# Patient Record
Sex: MALE | Race: ASIAN | NOT HISPANIC OR LATINO | Employment: FULL TIME | URBAN - METROPOLITAN AREA
[De-identification: names, ages, dates, MRNs, and addresses within clinical notes are randomized per-mention and may not be internally consistent; named-entity substitution may affect disease eponyms.]

---

## 2017-05-24 ENCOUNTER — HOSPITAL ENCOUNTER (INPATIENT)
Facility: HOSPITAL | Age: 32
LOS: 5 days | Discharge: HOME | DRG: 581 | End: 2017-05-29
Attending: EMERGENCY MEDICINE | Admitting: HOSPITALIST
Payer: COMMERCIAL

## 2017-05-24 DIAGNOSIS — L03.115 CELLULITIS OF RIGHT LOWER EXTREMITY: Primary | ICD-10-CM

## 2017-05-24 PROBLEM — L02.415 ABSCESS OF LEG, RIGHT: Status: ACTIVE | Noted: 2017-05-24

## 2017-05-24 PROBLEM — B20 HIV DISEASE: Status: ACTIVE | Noted: 2017-05-24

## 2017-05-24 LAB
ALBUMIN SERPL BCP-MCNC: 4.2 G/DL
ALP SERPL-CCNC: 109 U/L
ALT SERPL W/O P-5'-P-CCNC: 44 U/L
ANION GAP SERPL CALC-SCNC: 9 MMOL/L
ANISOCYTOSIS BLD QL SMEAR: SLIGHT
AST SERPL-CCNC: 30 U/L
BASOPHILS NFR BLD: 0 %
BILIRUB SERPL-MCNC: 0.6 MG/DL
BUN SERPL-MCNC: 17 MG/DL
CALCIUM SERPL-MCNC: 9.5 MG/DL
CHLORIDE SERPL-SCNC: 104 MMOL/L
CO2 SERPL-SCNC: 23 MMOL/L
CREAT SERPL-MCNC: 1.1 MG/DL
DIFFERENTIAL METHOD: ABNORMAL
EOSINOPHIL NFR BLD: 0 %
ERYTHROCYTE [DISTWIDTH] IN BLOOD BY AUTOMATED COUNT: 12.8 %
EST. GFR  (AFRICAN AMERICAN): >60 ML/MIN/1.73 M^2
EST. GFR  (NON AFRICAN AMERICAN): >60 ML/MIN/1.73 M^2
GLUCOSE SERPL-MCNC: 86 MG/DL
HCT VFR BLD AUTO: 48.3 %
HGB BLD-MCNC: 16.8 G/DL
LYMPHOCYTES NFR BLD: 36 %
MCH RBC QN AUTO: 30.1 PG
MCHC RBC AUTO-ENTMCNC: 34.8 %
MCV RBC AUTO: 86 FL
MONOCYTES NFR BLD: 10 %
NEUTROPHILS NFR BLD: 54 %
PLATELET # BLD AUTO: 294 K/UL
PMV BLD AUTO: 9.1 FL
POTASSIUM SERPL-SCNC: 4.3 MMOL/L
PROT SERPL-MCNC: 8.4 G/DL
RBC # BLD AUTO: 5.59 M/UL
SODIUM SERPL-SCNC: 136 MMOL/L
WBC # BLD AUTO: 4.86 K/UL

## 2017-05-24 PROCEDURE — 96366 THER/PROPH/DIAG IV INF ADDON: CPT

## 2017-05-24 PROCEDURE — 12000002 HC ACUTE/MED SURGE SEMI-PRIVATE ROOM

## 2017-05-24 PROCEDURE — 85027 COMPLETE CBC AUTOMATED: CPT

## 2017-05-24 PROCEDURE — 87070 CULTURE OTHR SPECIMN AEROBIC: CPT

## 2017-05-24 PROCEDURE — 99285 EMERGENCY DEPT VISIT HI MDM: CPT | Mod: 25

## 2017-05-24 PROCEDURE — 87186 SC STD MICRODIL/AGAR DIL: CPT

## 2017-05-24 PROCEDURE — 85007 BL SMEAR W/DIFF WBC COUNT: CPT

## 2017-05-24 PROCEDURE — 96375 TX/PRO/DX INJ NEW DRUG ADDON: CPT

## 2017-05-24 PROCEDURE — 87077 CULTURE AEROBIC IDENTIFY: CPT

## 2017-05-24 PROCEDURE — 87040 BLOOD CULTURE FOR BACTERIA: CPT

## 2017-05-24 PROCEDURE — 25000003 PHARM REV CODE 250: Performed by: EMERGENCY MEDICINE

## 2017-05-24 PROCEDURE — 63600175 PHARM REV CODE 636 W HCPCS: Performed by: EMERGENCY MEDICINE

## 2017-05-24 PROCEDURE — 10061 I&D ABSCESS COMP/MULTIPLE: CPT

## 2017-05-24 PROCEDURE — 96365 THER/PROPH/DIAG IV INF INIT: CPT

## 2017-05-24 PROCEDURE — 0J9L0ZZ DRAINAGE OF RIGHT UPPER LEG SUBCUTANEOUS TISSUE AND FASCIA, OPEN APPROACH: ICD-10-PCS | Performed by: EMERGENCY MEDICINE

## 2017-05-24 PROCEDURE — 96367 TX/PROPH/DG ADDL SEQ IV INF: CPT

## 2017-05-24 PROCEDURE — 80053 COMPREHEN METABOLIC PANEL: CPT

## 2017-05-24 RX ORDER — SULFAMETHOXAZOLE AND TRIMETHOPRIM 800; 160 MG/1; MG/1
1 TABLET ORAL
COMMUNITY
End: 2017-08-09 | Stop reason: ALTCHOICE

## 2017-05-24 RX ORDER — SODIUM CHLORIDE 0.9 % (FLUSH) 0.9 %
3 SYRINGE (ML) INJECTION EVERY 8 HOURS
Status: DISCONTINUED | OUTPATIENT
Start: 2017-05-24 | End: 2017-05-29 | Stop reason: HOSPADM

## 2017-05-24 RX ORDER — MORPHINE SULFATE 10 MG/ML
4 INJECTION INTRAMUSCULAR; INTRAVENOUS; SUBCUTANEOUS EVERY 4 HOURS PRN
Status: DISCONTINUED | OUTPATIENT
Start: 2017-05-24 | End: 2017-05-29 | Stop reason: HOSPADM

## 2017-05-24 RX ORDER — SODIUM CHLORIDE 9 MG/ML
INJECTION, SOLUTION INTRAVENOUS CONTINUOUS
Status: DISCONTINUED | OUTPATIENT
Start: 2017-05-24 | End: 2017-05-25

## 2017-05-24 RX ORDER — LEVOFLOXACIN 750 MG/1
750 TABLET ORAL DAILY
Status: ON HOLD | COMMUNITY
End: 2017-05-29 | Stop reason: HOSPADM

## 2017-05-24 RX ORDER — LIDOCAINE HYDROCHLORIDE 20 MG/ML
10 INJECTION, SOLUTION INFILTRATION; PERINEURAL
Status: COMPLETED | OUTPATIENT
Start: 2017-05-24 | End: 2017-05-24

## 2017-05-24 RX ORDER — MORPHINE SULFATE 10 MG/ML
5 INJECTION INTRAMUSCULAR; INTRAVENOUS; SUBCUTANEOUS ONCE
Status: COMPLETED | OUTPATIENT
Start: 2017-05-24 | End: 2017-05-24

## 2017-05-24 RX ORDER — ENOXAPARIN SODIUM 100 MG/ML
40 INJECTION SUBCUTANEOUS EVERY 24 HOURS
Status: DISCONTINUED | OUTPATIENT
Start: 2017-05-24 | End: 2017-05-24

## 2017-05-24 RX ORDER — ONDANSETRON 2 MG/ML
4 INJECTION INTRAMUSCULAR; INTRAVENOUS EVERY 12 HOURS PRN
Status: DISCONTINUED | OUTPATIENT
Start: 2017-05-24 | End: 2017-05-29 | Stop reason: HOSPADM

## 2017-05-24 RX ORDER — CLINDAMYCIN HYDROCHLORIDE 300 MG/1
300 CAPSULE ORAL EVERY 6 HOURS
Status: ON HOLD | COMMUNITY
End: 2017-05-29 | Stop reason: HOSPADM

## 2017-05-24 RX ADMIN — LIDOCAINE HYDROCHLORIDE 10 ML: 20 INJECTION, SOLUTION INFILTRATION; PERINEURAL at 11:05

## 2017-05-24 RX ADMIN — VANCOMYCIN HYDROCHLORIDE 1000 MG: 1 INJECTION, POWDER, LYOPHILIZED, FOR SOLUTION INTRAVENOUS at 12:05

## 2017-05-24 RX ADMIN — SODIUM CHLORIDE: 0.9 INJECTION, SOLUTION INTRAVENOUS at 09:05

## 2017-05-24 RX ADMIN — MORPHINE SULFATE 5 MG: 10 INJECTION INTRAVENOUS at 11:05

## 2017-05-24 RX ADMIN — Medication 3 ML: at 09:05

## 2017-05-24 RX ADMIN — PIPERACILLIN AND TAZOBACTAM 4.5 G: 4; .5 INJECTION, POWDER, LYOPHILIZED, FOR SOLUTION INTRAVENOUS; PARENTERAL at 09:05

## 2017-05-24 RX ADMIN — PIPERACILLIN AND TAZOBACTAM 4.5 G: 4; .5 INJECTION, POWDER, LYOPHILIZED, FOR SOLUTION INTRAVENOUS; PARENTERAL at 02:05

## 2017-05-24 RX ADMIN — SODIUM CHLORIDE: 0.9 INJECTION, SOLUTION INTRAVENOUS at 01:05

## 2017-05-24 NOTE — HPI
This 31 y.o. M, who has a past medical history of HIV disease and Syphilis, presents to the ED for evaluation of an abscess to the right medial thigh, just distal to the groin, x4 days. He notes he was seen at the [facility] 3 days ago and had it lanced. He was given two shots of antibiotics and started on Bactrim, Clindamycin and Levofloxacin. Despite compliance with antibiotics,swelling and redness got worse,he notes the site is more  red, painful and hard. He denies fever, nausea, vomiting, dizziness, numbness, weakness and headache. He is compliant with HIV medications as well.does not know his CD 4 count,Patient had another I&D in ER,only blood came out,culture has been done and patient has been started on broad spectrum IV Abx for failed out patient management for right leg abscess with cellulitis.

## 2017-05-24 NOTE — H&P
[facility] Hospital Medicine  History & Physical    Patient Name: Raz Webster  MRN: [id number]  Admission Date: 5/24/2017  Attending Physician: Eran Chacko MD   Primary Care Provider: Primary Doctor No         Patient information was obtained from patient and ER records.     Subjective:     Principal Problem:Abscess of leg, right    Chief Complaint:   Chief Complaint   Patient presents with    Rash     Pt. has abscess to the right thigh that began last week. Pt. states he was seen last monday at a health clinic and given three antibiotics and had abscess lanced. Pt. states he has had no impovment, wound is hard, and he developed a rash to face and abdomen today.     Abscess        HPI: This 31 y.o. M, who has a past medical history of HIV disease and Syphilis, presents to the ED for evaluation of an abscess to the right medial thigh, just distal to the groin, x4 days. He notes he was seen at the [facility] 3 days ago and had it lanced. He was given two shots of antibiotics and started on Bactrim, Clindamycin and Levofloxacin. Despite compliance with antibiotics,swelling and redness got worse,he notes the site is more  red, painful and hard. He denies fever, nausea, vomiting, dizziness, numbness, weakness and headache. He is compliant with HIV medications as well.does not know his CD 4 count,Patient had another I&D in ER,only blood came out,culture has been done and patient has been started on broad spectrum IV Abx for failed out patient management for right leg abscess with cellulitis.      Past Medical History:   Diagnosis Date    HIV disease     Syphilis        Past Surgical History:   Procedure Laterality Date    RECTAL SURGERY         Review of patient's allergies indicates:  No Known Allergies    No current facility-administered medications on file prior to encounter.      Current Outpatient Prescriptions on File Prior to Encounter   Medication Sig    desoximetasone  (TOPICORT) 0.25 % Spry Apply topically 2 (two) times daily.     Family History     None        Social History Main Topics    Smoking status: Never Smoker    Smokeless tobacco: Not on file    Alcohol use 0.0 oz/week      Comment: occasionally    Drug use: No    Sexual activity: Not on file     Review of Systems   Constitutional: Negative for activity change, appetite change and fever.   HENT: Negative for congestion and drooling.    Eyes: Negative for discharge and itching.   Respiratory: Negative for apnea and chest tightness.    Cardiovascular: Negative for chest pain and leg swelling.   Gastrointestinal: Negative for abdominal distention and abdominal pain.   Musculoskeletal: Negative for arthralgias and back pain.   Skin:        Right tight abscess,swelling,redness,tendeness   Allergic/Immunologic: Negative for environmental allergies and food allergies.   Neurological: Negative for dizziness and facial asymmetry.   Hematological: Negative for adenopathy. Does not bruise/bleed easily.   Psychiatric/Behavioral: Negative for agitation and behavioral problems.     Objective:     Vital Signs (Most Recent):  Temp: 98.5 °F (36.9 °C) (05/24/17 1254)  Pulse: 70 (05/24/17 1254)  Resp: 20 (05/24/17 1254)  BP: 102/62 (05/24/17 1254)  SpO2: 99 % (05/24/17 1254) Vital Signs (24h Range):  Temp:  [98 °F (36.7 °C)-98.5 °F (36.9 °C)] 98.5 °F (36.9 °C)  Pulse:  [70-93] 70  Resp:  [17-20] 20  SpO2:  [98 %-99 %] 99 %  BP: (102-132)/(62-81) 102/62     Weight: 68 kg (150 lb)  Body mass index is 22.81 kg/m².    Physical Exam   Constitutional: He is oriented to person, place, and time. No distress.   HENT:   Head: Atraumatic.   Eyes: Pupils are equal, round, and reactive to light.   Neck: Normal range of motion. Neck supple.   Cardiovascular: Normal rate and regular rhythm.    Pulmonary/Chest: Effort normal and breath sounds normal.   Abdominal: Soft. Bowel sounds are normal.   Musculoskeletal: Normal range of motion. He exhibits  edema.   Neurological: He is oriented to person, place, and time. No cranial nerve deficit. Coordination normal.   Skin: Skin is warm and dry. He is not diaphoretic.   Right tight abscess,swelling,redness,tendeness   Psychiatric: He has a normal mood and affect. His behavior is normal.        Significant Labs:   BMP:   Recent Labs  Lab 05/24/17  1100   GLU 86      K 4.3      CO2 23   BUN 17   CREATININE 1.1   CALCIUM 9.5     CBC:   Recent Labs  Lab 05/24/17  1100   WBC 4.86   HGB 16.8   HCT 48.3              Assessment/Plan:     * Abscess of leg, right    S/P I&D in ER,culture has been done,will continue with broad spectrum IV Abx,follow cultures.          Cellulitis of right lower extremity    On IV Abx as above.          HIV disease    Continue with home HIV medication.            VTE Risk Mitigation         Ordered     Medium Risk of VTE  Once      05/24/17 1220        Tano Caban MD  Department of Hospital Medicine   [facility]

## 2017-05-24 NOTE — ED PROVIDER NOTES
Encounter Date: 5/24/2017    SCRIBE #1 NOTE: I, Luis Mcgraw, am scribing for, and in the presence of,  Eran Chacko MD. I have scribed the following portions of the note - Other sections scribed: ROS, HPI.       History     Chief Complaint   Patient presents with    Rash     Pt. has abscess to the right thigh that began last week. Pt. states he was seen last monday at a health clinic and given three antibiotics and had abscess lanced. Pt. states he has had no impovment, wound is hard, and he developed a rash to face and abdomen today.     Abscess     Review of patient's allergies indicates:  No Known Allergies  CC: Abscess    HPI: This 31 y.o. M, who has a past medical history of HIV disease and Syphilis, presents to the ED for evaluation of an abscess to the right medial thigh, just distal to the groin, x4 days. He notes he was seen at the [facility] 3 days ago and had it lanced. He was given two shots of antibiotics and started on Bactrim, Clindamycin and Levofloxacin. Despite compliance with antibiotics, symptoms have redeveloped; he notes the site is red, painful and hard. He denies fever, nausea, vomiting, dizziness, numbness, weakness and headache. He is compliant with HIV medications as well. No hx of diabetes.           Past Medical History:   Diagnosis Date    HIV disease     Syphilis      Past Surgical History:   Procedure Laterality Date    RECTAL SURGERY       History reviewed. No pertinent family history.  Social History   Substance Use Topics    Smoking status: Never Smoker    Smokeless tobacco: Not on file    Alcohol use 0.0 oz/week      Comment: occasionally     Review of Systems   Constitutional: Negative for fever.   HENT: Negative for sore throat.    Eyes: Negative for visual disturbance.   Respiratory: Negative for shortness of breath.    Cardiovascular: Negative for chest pain.   Gastrointestinal: Negative for nausea and vomiting.   Genitourinary: Negative for  dysuria.   Musculoskeletal: Negative for back pain.   Skin: Positive for color change and wound. Negative for rash.        (+) Abscess to the right lateral thigh, just distal to the groin   Neurological: Negative for dizziness, weakness and numbness.   Hematological: Does not bruise/bleed easily.       Physical Exam     Initial Vitals [05/24/17 0946]   BP Pulse Resp Temp SpO2   132/81 93 17 98 °F (36.7 °C) 98 %     Physical Exam    Vitals reviewed.  Constitutional: He appears well-developed and well-nourished.   HENT:   Head: Normocephalic and atraumatic.   Eyes: EOM are normal. Pupils are equal, round, and reactive to light.   Neck: Normal range of motion. Neck supple.   Cardiovascular: Normal rate, regular rhythm, normal heart sounds and intact distal pulses.   Pulmonary/Chest: Breath sounds normal. No respiratory distress. He has no wheezes. He has no rhonchi. He has no rales.   Abdominal: Soft. Bowel sounds are normal.   Musculoskeletal: Normal range of motion.   Neurological: He is alert and oriented to person, place, and time.   Skin: Skin is warm and dry. Rash and abscess noted. Rash is papular (trunk). There is erythema.        See media tab for picture of infected area   Psychiatric: He has a normal mood and affect.         ED Course   I & D - Incision and Drainage  Date/Time: 5/24/2017 12:26 PM  Performed by: TAYLOR FLORES  Authorized by: TAYLOR FLORES   Consent Done: Yes  Consent: Verbal consent obtained.  Risks and benefits: risks, benefits and alternatives were discussed  Consent given by: patient  Type: abscess  Anesthesia: local infiltration    Anesthesia:  Anesthesia: local infiltration  Local Anesthetic: lidocaine 1% without epinephrine   Risk factor: underlying major vessel  Scalpel size: 11  Incision type: single straight  Complexity: simple  Drainage: bloody  Wound treatment: incision,  wound left open,  drainage,  expression of material,  clot removal,  drain placed and  wound  packed  Packing material: 1/4 in gauze        Labs Reviewed   CBC W/ AUTO DIFFERENTIAL - Abnormal; Notable for the following:        Result Value    MPV 9.1 (*)     All other components within normal limits   CULTURE, AEROBIC  (SPECIFY SOURCE)    Narrative:     Culture #1   CULTURE, BLOOD   CULTURE, BLOOD   COMPREHENSIVE METABOLIC PANEL             Medical Decision Making:   History:   Old Medical Records: I decided to obtain old medical records.  Initial Assessment:   Medical decision-making:    The patient received a medical screening exam. If performed, the EKG was independently evaluated by me and is pending final cardiology evaluation.  If performed, all radiographic studies were independently evaluated by me and are pending final radiology evaluation. If labs were ordered, they were reviewed. Vital signs are independently assessed by me.  If performed, the pulse oximetry was independently evaluated by me.  I decided to obtain the patient's past medical record.  If available, I reviewed the patient's past medical record, including most recent labs and radiology reports.    Differential Diagnosis:   Cellulitis, abscess, hematoma, vasculitis.   Clinical Tests:   Lab Tests: Ordered and Reviewed  ED Management:  Pt with HIV on medication with cellulitis to the RLE that has failed outpatient therapy with I&D and three types abx.   I performed I&D as above. Mostly bloody. Culture sent. Will start treatment with IV abx.  I discussed the patient's presentation and workup with the hospitalist who agrees with placing the patient in the hospital.  I have placed orders for the hospitalist.   The results and physical exam findings were reviewed with the patient. Pt agrees with assessment, disposition and treatment plan and has no further questions or complaints at this time.    ZANDER Chacko M.D. 12:28 PM 5/24/2017              Scribe Attestation:   Scribe #1: I performed the above scribed service and the documentation  accurately describes the services I performed. I attest to the accuracy of the note.    Attending Attestation:           Physician Attestation for Scribe:  Physician Attestation Statement for Scribe #1: I, Eran Chacko MD, reviewed documentation, as scribed by Luis Mcgraw in my presence, and it is both accurate and complete.                 ED Course     Clinical Impression:   The encounter diagnosis was Cellulitis of right lower extremity.    Disposition:   Disposition: Admitted  Condition: Stable       Eran Chacko MD  05/24/17 1223

## 2017-05-24 NOTE — NURSING
Patient arrived to unit via stretcher, transferred to bed independently,AAOx4,  mother at bedside. No acute distress noted.

## 2017-05-24 NOTE — SUBJECTIVE & OBJECTIVE
Past Medical History:   Diagnosis Date    HIV disease     Syphilis        Past Surgical History:   Procedure Laterality Date    RECTAL SURGERY         Review of patient's allergies indicates:  No Known Allergies    No current facility-administered medications on file prior to encounter.      Current Outpatient Prescriptions on File Prior to Encounter   Medication Sig    desoximetasone (TOPICORT) 0.25 % Spry Apply topically 2 (two) times daily.     Family History     None        Social History Main Topics    Smoking status: Never Smoker    Smokeless tobacco: Not on file    Alcohol use 0.0 oz/week      Comment: occasionally    Drug use: No    Sexual activity: Not on file     Review of Systems   Constitutional: Negative for activity change, appetite change and fever.   HENT: Negative for congestion and drooling.    Eyes: Negative for discharge and itching.   Respiratory: Negative for apnea and chest tightness.    Cardiovascular: Negative for chest pain and leg swelling.   Gastrointestinal: Negative for abdominal distention and abdominal pain.   Musculoskeletal: Negative for arthralgias and back pain.   Skin:        Right tight abscess,swelling,redness,tendeness   Allergic/Immunologic: Negative for environmental allergies and food allergies.   Neurological: Negative for dizziness and facial asymmetry.   Hematological: Negative for adenopathy. Does not bruise/bleed easily.   Psychiatric/Behavioral: Negative for agitation and behavioral problems.     Objective:     Vital Signs (Most Recent):  Temp: 98.5 °F (36.9 °C) (05/24/17 1254)  Pulse: 70 (05/24/17 1254)  Resp: 20 (05/24/17 1254)  BP: 102/62 (05/24/17 1254)  SpO2: 99 % (05/24/17 1254) Vital Signs (24h Range):  Temp:  [98 °F (36.7 °C)-98.5 °F (36.9 °C)] 98.5 °F (36.9 °C)  Pulse:  [70-93] 70  Resp:  [17-20] 20  SpO2:  [98 %-99 %] 99 %  BP: (102-132)/(62-81) 102/62     Weight: 68 kg (150 lb)  Body mass index is 22.81 kg/m².    Physical Exam   Constitutional: He  is oriented to person, place, and time. No distress.   HENT:   Head: Atraumatic.   Eyes: Pupils are equal, round, and reactive to light.   Neck: Normal range of motion. Neck supple.   Cardiovascular: Normal rate and regular rhythm.    Pulmonary/Chest: Effort normal and breath sounds normal.   Abdominal: Soft. Bowel sounds are normal.   Musculoskeletal: Normal range of motion. He exhibits edema.   Neurological: He is oriented to person, place, and time. No cranial nerve deficit. Coordination normal.   Skin: Skin is warm and dry. He is not diaphoretic.   Right tight abscess,swelling,redness,tendeness   Psychiatric: He has a normal mood and affect. His behavior is normal.        Significant Labs:   BMP:   Recent Labs  Lab 05/24/17  1100   GLU 86      K 4.3      CO2 23   BUN 17   CREATININE 1.1   CALCIUM 9.5     CBC:   Recent Labs  Lab 05/24/17  1100   WBC 4.86   HGB 16.8   HCT 48.3

## 2017-05-24 NOTE — ED TRIAGE NOTES
Pt. has abscess to the right thigh that began last week. Pt. states he was seen last monday at a health clinic and given three antibiotics and had abscess lanced. 15cm x 20 cm area with 3cm open area in center. Pt. states he has had no impovment, wound is hard, and he developed a rash to face and abdomen today. Hx of HIV.

## 2017-05-25 PROBLEM — R21 RASH OF BODY: Status: ACTIVE | Noted: 2017-05-25

## 2017-05-25 LAB
ANION GAP SERPL CALC-SCNC: 8 MMOL/L
ANION GAP SERPL CALC-SCNC: 8 MMOL/L
BASOPHILS # BLD AUTO: 0.04 K/UL
BASOPHILS NFR BLD: 0.7 %
BUN SERPL-MCNC: 14 MG/DL
BUN SERPL-MCNC: 14 MG/DL
CALCIUM SERPL-MCNC: 8.5 MG/DL
CALCIUM SERPL-MCNC: 8.5 MG/DL
CHLORIDE SERPL-SCNC: 106 MMOL/L
CHLORIDE SERPL-SCNC: 106 MMOL/L
CO2 SERPL-SCNC: 22 MMOL/L
CO2 SERPL-SCNC: 22 MMOL/L
CREAT SERPL-MCNC: 1.2 MG/DL
CREAT SERPL-MCNC: 1.2 MG/DL
DIFFERENTIAL METHOD: ABNORMAL
EOSINOPHIL # BLD AUTO: 0.2 K/UL
EOSINOPHIL NFR BLD: 4.1 %
ERYTHROCYTE [DISTWIDTH] IN BLOOD BY AUTOMATED COUNT: 13.2 %
EST. GFR  (AFRICAN AMERICAN): >60 ML/MIN/1.73 M^2
EST. GFR  (AFRICAN AMERICAN): >60 ML/MIN/1.73 M^2
EST. GFR  (NON AFRICAN AMERICAN): >60 ML/MIN/1.73 M^2
EST. GFR  (NON AFRICAN AMERICAN): >60 ML/MIN/1.73 M^2
GLUCOSE SERPL-MCNC: 102 MG/DL
GLUCOSE SERPL-MCNC: 102 MG/DL
HCT VFR BLD AUTO: 44.5 %
HGB BLD-MCNC: 15.3 G/DL
LYMPHOCYTES # BLD AUTO: 1.9 K/UL
LYMPHOCYTES NFR BLD: 33.9 %
MCH RBC QN AUTO: 30.1 PG
MCHC RBC AUTO-ENTMCNC: 34.4 %
MCV RBC AUTO: 88 FL
MONOCYTES # BLD AUTO: 0.7 K/UL
MONOCYTES NFR BLD: 12.1 %
NEUTROPHILS # BLD AUTO: 2.7 K/UL
NEUTROPHILS NFR BLD: 47.8 %
PLATELET # BLD AUTO: 280 K/UL
PMV BLD AUTO: 8.9 FL
POTASSIUM SERPL-SCNC: 4.3 MMOL/L
POTASSIUM SERPL-SCNC: 4.3 MMOL/L
RBC # BLD AUTO: 5.08 M/UL
SODIUM SERPL-SCNC: 136 MMOL/L
SODIUM SERPL-SCNC: 136 MMOL/L
WBC # BLD AUTO: 5.61 K/UL

## 2017-05-25 PROCEDURE — 80048 BASIC METABOLIC PNL TOTAL CA: CPT

## 2017-05-25 PROCEDURE — 63600175 PHARM REV CODE 636 W HCPCS: Performed by: INTERNAL MEDICINE

## 2017-05-25 PROCEDURE — 86592 SYPHILIS TEST NON-TREP QUAL: CPT

## 2017-05-25 PROCEDURE — 36415 COLL VENOUS BLD VENIPUNCTURE: CPT

## 2017-05-25 PROCEDURE — 25000003 PHARM REV CODE 250: Performed by: EMERGENCY MEDICINE

## 2017-05-25 PROCEDURE — 85025 COMPLETE CBC W/AUTO DIFF WBC: CPT

## 2017-05-25 PROCEDURE — 12000002 HC ACUTE/MED SURGE SEMI-PRIVATE ROOM

## 2017-05-25 PROCEDURE — 63600175 PHARM REV CODE 636 W HCPCS: Performed by: EMERGENCY MEDICINE

## 2017-05-25 PROCEDURE — 80202 ASSAY OF VANCOMYCIN: CPT

## 2017-05-25 RX ORDER — TRIAMCINOLONE ACETONIDE 1 MG/G
CREAM TOPICAL 2 TIMES DAILY
Status: DISCONTINUED | OUTPATIENT
Start: 2017-05-25 | End: 2017-05-25

## 2017-05-25 RX ADMIN — MORPHINE SULFATE 4 MG: 10 INJECTION INTRAVENOUS at 02:05

## 2017-05-25 RX ADMIN — VANCOMYCIN HYDROCHLORIDE 1000 MG: 1 INJECTION, POWDER, LYOPHILIZED, FOR SOLUTION INTRAVENOUS at 11:05

## 2017-05-25 RX ADMIN — PIPERACILLIN AND TAZOBACTAM 4.5 G: 4; .5 INJECTION, POWDER, LYOPHILIZED, FOR SOLUTION INTRAVENOUS; PARENTERAL at 05:05

## 2017-05-25 RX ADMIN — PENICILLIN G BENZATHINE 2.4 MILLION UNITS: 1200000 INJECTION, SUSPENSION INTRAMUSCULAR at 11:05

## 2017-05-25 RX ADMIN — VANCOMYCIN HYDROCHLORIDE 1000 MG: 1 INJECTION, POWDER, LYOPHILIZED, FOR SOLUTION INTRAVENOUS at 01:05

## 2017-05-25 RX ADMIN — Medication 3 ML: at 05:05

## 2017-05-25 NOTE — HOSPITAL COURSE
"Mr. Webster was admitted for failed outpatient management for right thigh abscess with cellulitis. Pt was started on broad spectrum antibiotics and was seen by Surgery. U/S showed no sign of fluid collection and Surgery felt no further intervention was necessary with only recommendation for daily wound care. Pt did have a rash develop that was thought to be related to Bactrim vs a dose of penicillin G given in case this was due to Syphilis. RPR was negative. CD4 count was not obtained given acute illness but patient did report it was going "the wrong way". Continued on HARRT. Blood cultures negative. Culture of purulent material grew MRSA. Treated with vancomycin and wound care while inpatient. Patient was taught by nursing on how to do dressing changes so can do it himself at home. Materials provided for home use. Abx changed to linezolid to complete a total of 14 days of antibiotic therapy, per ID recs. Is to follow up with HIV specialist and is to establish care with PCP. Regular diet. Activity as tolerated.   "

## 2017-05-25 NOTE — PROGRESS NOTES
Dr. Yu notified by patient of rash to body. Order noted for cream to apply to rash. Patient informed of NPO status until surgery sees him.

## 2017-05-25 NOTE — PLAN OF CARE
Problem: Patient Care Overview  Goal: Individualization & Mutuality  Outcome: Ongoing (interventions implemented as appropriate)  Pt progressing. Oriented x4. Voiding well. Skin integrity maintained. Pain controlled. VS stable. Adequate oral intake. Tolerating diet. IV fluids/antibiotics maintained. Free of falls. Call light in reach. Low bed. No issues during shift. Continue plan of care.      Problem: HIV/AIDS (Adult)  Goal: Signs and Symptoms of Listed Potential Problems Will be Absent, Minimized or Managed (HIV/AIDS)  Signs and symptoms of listed potential problems will be absent, minimized or managed by discharge/transition of care (reference HIV/AIDS (Adult) CPG).   Outcome: Ongoing (interventions implemented as appropriate)  Pt's home HIV medication given.     Problem: Skin and Soft Tissue Infection (Adult)  Goal: Signs and Symptoms of Listed Potential Problems Will be Absent, Minimized or Managed (Skin and Soft Tissue Infection)  Signs and symptoms of listed potential problems will be absent, minimized or managed by discharge/transition of care (reference Skin and Soft Tissue Infection (Adult) CPG).   Outcome: Ongoing (interventions implemented as appropriate)  Dried drainage noted to right thigh. I&D in ER. No wound care orders at this time. Dressing clean and intact.     Problem: Fall Risk (Adult)  Goal: Identify Related Risk Factors and Signs and Symptoms  Related risk factors and signs and symptoms are identified upon initiation of Human Response Clinical Practice Guideline (CPG)   Outcome: Ongoing (interventions implemented as appropriate)  Free of falls.     Problem: Pain, Acute (Adult)  Goal: Identify Related Risk Factors and Signs and Symptoms  Related risk factors and signs and symptoms are identified upon initiation of Human Response Clinical Practice Guideline (CPG)   Outcome: Ongoing (interventions implemented as appropriate)  No reports of pain at this time.

## 2017-05-25 NOTE — PLAN OF CARE
Problem: Patient Care Overview  Goal: Plan of Care Review  Outcome: Ongoing (interventions implemented as appropriate)  Continue with wound care to right thigh BID. Continue with IVAB. ID on case and general surgery.Had U/S of right lower ext. Today.

## 2017-05-25 NOTE — ASSESSMENT & PLAN NOTE
Started yesterday,could not be duo to IV Abx,also PO Abx was started few days back,started on kenalog oitment.

## 2017-05-25 NOTE — SUBJECTIVE & OBJECTIVE
Past Medical History:   Diagnosis Date    HIV disease     Syphilis        Past Surgical History:   Procedure Laterality Date    RECTAL SURGERY         Review of patient's allergies indicates:  No Known Allergies    No current facility-administered medications on file prior to encounter.      Current Outpatient Prescriptions on File Prior to Encounter   Medication Sig    desoximetasone (TOPICORT) 0.25 % Spry Apply topically 2 (two) times daily.     Family History     None        Social History Main Topics    Smoking status: Never Smoker    Smokeless tobacco: Not on file    Alcohol use 0.0 oz/week      Comment: occasionally    Drug use: No    Sexual activity: Not on file     Review of Systems   Constitutional: Negative for activity change, appetite change and fever.   HENT: Negative for congestion and drooling.    Eyes: Negative for discharge and itching.   Respiratory: Negative for apnea and chest tightness.    Cardiovascular: Negative for chest pain and leg swelling.   Gastrointestinal: Negative for abdominal distention and abdominal pain.   Musculoskeletal: Negative for arthralgias and back pain.   Skin:        Right tight abscess,swelling,redness,tendeness   Allergic/Immunologic: Negative for environmental allergies and food allergies.   Neurological: Negative for dizziness and facial asymmetry.   Hematological: Negative for adenopathy. Does not bruise/bleed easily.   Psychiatric/Behavioral: Negative for agitation and behavioral problems.     Objective:     Vital Signs (Most Recent):  Temp: 97.7 °F (36.5 °C) (05/25/17 0357)  Pulse: 70 (05/25/17 0357)  Resp: 17 (05/25/17 0357)  BP: 98/63 (05/25/17 0357)  SpO2: 98 % (05/25/17 0357) Vital Signs (24h Range):  Temp:  [97.7 °F (36.5 °C)-98.8 °F (37.1 °C)] 97.7 °F (36.5 °C)  Pulse:  [70-93] 70  Resp:  [17-20] 17  SpO2:  [97 %-99 %] 98 %  BP: ()/(55-81) 98/63     Weight: 68 kg (150 lb)  Body mass index is 22.81 kg/m².    Physical Exam   Constitutional: He  is oriented to person, place, and time. No distress.   HENT:   Head: Atraumatic.   Eyes: Pupils are equal, round, and reactive to light.   Neck: Normal range of motion. Neck supple.   Cardiovascular: Normal rate and regular rhythm.    Pulmonary/Chest: Effort normal and breath sounds normal.   Abdominal: Soft. Bowel sounds are normal.   Musculoskeletal: Normal range of motion. He exhibits edema.   Neurological: He is oriented to person, place, and time. No cranial nerve deficit. Coordination normal.   Skin: Skin is warm and dry. He is not diaphoretic.   Right tight abscess,swelling,redness,tendeness   Psychiatric: He has a normal mood and affect. His behavior is normal.        Significant Labs:   BMP:     Recent Labs  Lab 05/25/17  0319     102     136   K 4.3  4.3     106   CO2 22*  22*   BUN 14  14   CREATININE 1.2  1.2   CALCIUM 8.5*  8.5*     CBC:     Recent Labs  Lab 05/24/17  1100 05/25/17  0319   WBC 4.86 5.61   HGB 16.8 15.3   HCT 48.3 44.5    280

## 2017-05-25 NOTE — PLAN OF CARE
05/25/17 1157   Discharge Assessment   Assessment Type Discharge Planning Assessment   Confirmed/corrected address and phone number on facesheet? Yes   Assessment information obtained from? Patient   Prior to hospitilization cognitive status: Alert/Oriented   Prior to hospitalization functional status: Independent   Current cognitive status: Alert/Oriented   Current Functional Status: Independent   Arrived From home or self-care   Lives With alone   Able to Return to Prior Arrangements yes   Is patient able to care for self after discharge? Yes   How many people do you have in your home that can help with your care after discharge? 0   Who are your caregiver(s) and their phone number(s)? Pt mother Malka [phone]   Patient's perception of discharge disposition home or selfcare   Readmission Within The Last 30 Days no previous admission in last 30 days   Patient currently being followed by outpatient case management? No   Patient currently receives home health services? No   Does the patient currently use HME? No   Patient currently receives private duty nursing? No   Patient currently receives any other outside agency services? No   Do you have any problems affording any of your prescribed medications? No   Is the patient taking medications as prescribed? yes   Do you have any financial concerns preventing you from receiving the healthcare you need? No   Does the patient have transportation to healthcare appointments? No   Does the patient receive services at the [facility]? No   Discharge Plan A Home with family   Discharge Plan B Home   Patient/Family In Agreement With Plan yes     Pt demographics verified. Pt informed SW he lives home alone, he is independent, and he is able to manage his care at home. SW explained the role of CM and provided pt with SW contact information. SW name and number placed on pt white board.       [employer] [facility] [employer] [address] [place] [address], [place] - [address] AT [facility] [address]  [address]  [place] [address]  Phone: [phone] Fax: [phone]

## 2017-05-25 NOTE — CONSULTS
Consult Note  Infectious Disease    Consult Requested By: Tano Caban MD    Reason for Consult: rash and rt thigh abscess    SUBJECTIVE:     History of Present Illness:  Patient is a 31 y.o. male presents with nonhealing rt thigh abscess. He is hiv positive for 2 years now and is rx with descovy and prezcobix by . He does not know his cd4 count but states his numbers are heading in the wrong direction.he became ill with a rt thigh abscess about 8 days ago and had an i and  D in an urgent care clinic. He was rx with bactrim, levaquin and clindamycin but did not resolve. He developed a rash yesterday after 7 days of rx. He has had syphilis before and does admit to unprotected oral sex.an us of the rt thigh is planned, further i and  D carried out in er. Blood cultures are negative from 5/24/17.    Past Medical History:   Diagnosis Date    HIV disease     Syphilis      Past Surgical History:   Procedure Laterality Date    RECTAL SURGERY       History reviewed. No pertinent family history.  Social History   Substance Use Topics    Smoking status: Never Smoker    Smokeless tobacco: Not on file    Alcohol use 0.0 oz/week      Comment: occasionally       Review of patient's allergies indicates:  No Known Allergies     Antibiotics     Start     Stop Route Frequency Ordered    05/25/17 0000  vancomycin 1 g in dextrose 5 % 250 mL IVPB (ready to mix system)      -- IV Every 12 hours (non-standard times) 05/24/17 1220    05/24/17 1330  piperacillin-tazobactam 4.5 g in sodium chloride 0.9% 100 mL IVPB (ready to mix system)      -- IV Every 8 hours (non-standard times) 05/24/17 1220          Review of Systems:  Constitutional: no fever or chills  Eyes: no visual changes  ENT: no nasal congestion or sore throat  Respiratory: no cough or shortness of breath  Cardiovascular: no chest pain or palpitations  Gastrointestinal: no nausea or vomiting, no abdominal pain or change in bowel habits  Genitourinary:  no hematuria or dysuria  Integument/Breast: rt thigh redness. rash all over body- not itchy  Musculoskeletal: no arthralgias or myalgias  Neurological: no seizures or tremors    OBJECTIVE:     Vital Signs (Most Recent)  Temp: 98.4 °F (36.9 °C) (05/25/17 0755)  Pulse: 61 (05/25/17 0755)  Resp: 18 (05/25/17 0755)  BP: (!) 100/58 (05/25/17 0755)  SpO2: 98 % (05/25/17 0755)    Temperature Range Min/Max (Last 24H):  Temp:  [97.7 °F (36.5 °C)-98.8 °F (37.1 °C)]     Physical Exam:  General: well developed, well nourished  HENT: Head:normocephalic, atraumatic. Ears:not examined. Nose: Nares normal. Septum midline. Mucosa normal. No drainage or sinus tenderness., no discharge. Throat: lips, mucosa, and tongue normal; teeth and gums normal and no throat erythema.  Eyes: conjunctivae/corneas clear. PERRL.   Neck: supple, symmetrical, trachea midline, no JVD and thyroid not enlarged, symmetric, no tenderness/mass/nodules  Lungs:  clear to auscultation bilaterally and normal respiratory effort  Cardiovascular: Heart: regular rate and rhythm, S1, S2 normal, no murmur, click, rub or gallop. Chest Wall: no tenderness. Extremities: no cyanosis or edema, or clubbing. Pulses: 2+ and symmetric.  Abdomen/Rectal: Abdomen: soft, non-tender non-distented; bowel sounds normal; no masses,  no organomegaly. Rectal: not examined  Skin: diffuse rash all over body with involvement of palms and soles  Musculoskeletal:no clubbing, cyanosis  Lymph Nodes: No cervical or supraclavicular adenopathy    Laboratory:  CBC    Recent Labs  Lab 05/25/17 0319   WBC 5.61   RBC 5.08   HGB 15.3   HCT 44.5        BMP    Recent Labs  Lab 05/25/17 0319   CO2 22*  22*   BUN 14  14   CREATININE 1.2  1.2   CALCIUM 8.5*  8.5*     No results for input(s): COLORU, CLARITYU, SPECGRAV, PHUR, PROTEINUA, GLUCOSEU, BILIRUBINCON, BLOODU, WBCU, RBCU, BACTERIA, MUCUS, NITRITE, LEUKOCYTESUR, UROBILINOGEN, HYALINECASTS in the last 168 hours.  Microbiology Results  (last 7 days)     Procedure Component Value Units Date/Time    Blood culture [[id number]] Collected:  05/24/17 1128    Order Status:  Completed Specimen:  Blood from Peripheral, Hand, Right Updated:  05/24/17 2112     Blood Culture, Routine No Growth to date    Blood culture [[id number]] Collected:  05/24/17 1144    Order Status:  Completed Specimen:  Blood from Peripheral, Hand, Left Updated:  05/24/17 1912     Blood Culture, Routine No Growth to date    Aerobic culture #1 [[id number]] Collected:  05/24/17 1200    Order Status:  Sent Specimen:  Abscess from Abscess Updated:  05/24/17 1225    Narrative:       Culture #1          Diagnostic Results:  Labs: Reviewed  X-Ray: Reviewed    ASSESSMENT/PLAN:     Active Hospital Problems    Diagnosis  POA    *Abscess of leg, right [L02.415]  Yes    Rash of body [R21]  Yes    Cellulitis of right lower extremity [L03.115]  Yes    HIV disease [B20]  Yes      Resolved Hospital Problems    Diagnosis Date Resolved POA   No resolved problems to display.       1. Rt thigh abscess  Suspect staph aureus  2. Diffuse rash- concern for syphilis  3. Hiv, cd4 unknown. Continue haart and fu with primary id md

## 2017-05-25 NOTE — ASSESSMENT & PLAN NOTE
S/P I&D in ER,culture has been done,will continue with broad spectrum IV Abx,follow cultures.still is swollen,consult surgery,NPO at this time.

## 2017-05-25 NOTE — CONSULTS
[facility]  Surgery Consult    Subjective:     Reason for consultation: right thigh abscess    History of Present Illness:   Patient is a 31 y.o. male presents with abscess. Onset of symptoms was gradual starting a few days ago with unchanged course since that time. Patient report I&D at outside facility and started on PO ABX.  Redness failed to improve therefore presented to the ED.  Had repeat I&D w/ drainage bloody fluid.  He denies fevers or chills.    Patient Active Problem List   Diagnosis    Cellulitis of right lower extremity    Abscess of leg, right    HIV disease    Rash of body          No current facility-administered medications on file prior to encounter.      Current Outpatient Prescriptions on File Prior to Encounter   Medication Sig Dispense Refill    desoximetasone (TOPICORT) 0.25 % Spry Apply topically 2 (two) times daily.          Review of patient's allergies indicates:  No Known Allergies       Past Medical History:   Diagnosis Date    HIV disease     Syphilis           Past Surgical History:   Procedure Laterality Date    RECTAL SURGERY          History reviewed. No pertinent family history.       Social History     Social History    Marital status: Single     Spouse name: N/A    Number of children: N/A    Years of education: N/A     Occupational History    Not on file.     Social History Main Topics    Smoking status: Never Smoker    Smokeless tobacco: Not on file    Alcohol use 0.0 oz/week      Comment: occasionally    Drug use: No    Sexual activity: Not on file     Other Topics Concern    Not on file     Social History Narrative    No narrative on file         Review of Systems:  Constitutional: negative for anorexia, chills and fevers  Respiratory: negative for cough and dyspnea on exertion  Cardiovascular: negative for chest pain, fatigue and palpitations  Hematologic/lymphatic: negative for bleeding and easy bruising     Physical Exam:    Vitals:     05/25/17 0357   BP: 98/63   Pulse: 70   Resp: 17   Temp: 97.7 °F (36.5 °C)       General: Alert and oriented, No acute distress.    Neck: Supple, Non-tender, No jugular venous distention.  No mass or LAD  Respiratory: Respirations are non-labored, Symmetrical chest wall expansion, No chest wall tenderness.   Cardiovascular: Normal rate, Regular rhythm, Extremities warm, No edema.   Gastrointestinal: Soft, Non-tender, Non-distended, No organomegaly.    Musculoskeletal: grossly Normal range of motion, Normal strength, No tenderness.   Integumentary: R upper inner thigh w/ I&D site, surrounding induration and cellulitis and packing place.  Packing removed, no drainage on palpatoin.  No fluctuance      Data Review:  CBC:   Recent Labs  Lab 05/25/17 0319   WBC 5.61   RBC 5.08   HGB 15.3   HCT 44.5      MCV 88   MCH 30.1   MCHC 34.4     CMP:   Recent Labs  Lab 05/24/17  1100 05/25/17 0319   GLU 86 102  102   CALCIUM 9.5 8.5*  8.5*   ALBUMIN 4.2  --    PROT 8.4  --     136  136   K 4.3 4.3  4.3   CO2 23 22*  22*    106  106   BUN 17 14  14   CREATININE 1.1 1.2  1.2   ALKPHOS 109  --    ALT 44  --    AST 30  --    BILITOT 0.6  --        ASSESSMENT/PLAN:     R thigh soft tissue infection  - s/p I&D by ERMD  - does not appear to have undrained process   - will order u/s to further evaluate  - begin BID packing changes  - ABX as per primary team  - ok for diet at this time

## 2017-05-25 NOTE — PROGRESS NOTES
[facility] Hospital Medicine  Progress Note    Patient Name: Raz Webster  MRN: [id number]  Patient Class: IP- Inpatient   Admission Date: 5/24/2017  Length of Stay: 1 days  Attending Physician: Tano Caban MD  Primary Care Provider: Primary Doctor No        Subjective:     Principal Problem:Abscess of leg, right    HPI:  This 31 y.o. M, who has a past medical history of HIV disease and Syphilis, presents to the ED for evaluation of an abscess to the right medial thigh, just distal to the groin, x4 days. He notes he was seen at the [facility] 3 days ago and had it lanced. He was given two shots of antibiotics and started on Bactrim, Clindamycin and Levofloxacin. Despite compliance with antibiotics,swelling and redness got worse,he notes the site is more  red, painful and hard. He denies fever, nausea, vomiting, dizziness, numbness, weakness and headache. He is compliant with HIV medications as well.does not know his CD 4 count,Patient had another I&D in ER,only blood came out,culture has been done and patient has been started on broad spectrum IV Abx for failed out patient management for right leg abscess with cellulitis.      Hospital Course:  This 31 y.o. M, who has a past medical history of HIV disease and Syphilis, presents to the ED for evaluation of an abscess to the right medial thigh, just distal to the groin, x4 days. He notes he was seen at the [facility] few  days ago and had it lanced. He was given two shots of antibiotics and started on Bactrim, Clindamycin and Levofloxacin. Despite compliance with antibiotics,swelling and redness got worse,he notes the site is more  red, painful and hard. He denies fever, nausea, vomiting, dizziness, numbness, weakness and headache. He is compliant with HIV medications as well.does not know his CD 4 count,he has been followed by ID in ,Patient had another I&D in ER,only blood came out,culture has been done and patient  has been started on broad spectrum IV Abx for failed out patient management for right leg abscess with cellulitis.right thigh is still swollen,consulted surgery for evaluation.keep NPO at this time.  Has rash in body,started just yesetday,could not be duo to IV Abx,he started also PO A bx few days ago.started on kenalog.      Past Medical History:   Diagnosis Date    HIV disease     Syphilis        Past Surgical History:   Procedure Laterality Date    RECTAL SURGERY         Review of patient's allergies indicates:  No Known Allergies    No current facility-administered medications on file prior to encounter.      Current Outpatient Prescriptions on File Prior to Encounter   Medication Sig    desoximetasone (TOPICORT) 0.25 % Spry Apply topically 2 (two) times daily.     Family History     None        Social History Main Topics    Smoking status: Never Smoker    Smokeless tobacco: Not on file    Alcohol use 0.0 oz/week      Comment: occasionally    Drug use: No    Sexual activity: Not on file     Review of Systems   Constitutional: Negative for activity change, appetite change and fever.   HENT: Negative for congestion and drooling.    Eyes: Negative for discharge and itching.   Respiratory: Negative for apnea and chest tightness.    Cardiovascular: Negative for chest pain and leg swelling.   Gastrointestinal: Negative for abdominal distention and abdominal pain.   Musculoskeletal: Negative for arthralgias and back pain.   Skin:        Right tight abscess,swelling,redness,tendeness   Allergic/Immunologic: Negative for environmental allergies and food allergies.   Neurological: Negative for dizziness and facial asymmetry.   Hematological: Negative for adenopathy. Does not bruise/bleed easily.   Psychiatric/Behavioral: Negative for agitation and behavioral problems.     Objective:     Vital Signs (Most Recent):  Temp: 97.7 °F (36.5 °C) (05/25/17 0357)  Pulse: 70 (05/25/17 0357)  Resp: 17 (05/25/17 0357)  BP:  98/63 (05/25/17 0357)  SpO2: 98 % (05/25/17 0357) Vital Signs (24h Range):  Temp:  [97.7 °F (36.5 °C)-98.8 °F (37.1 °C)] 97.7 °F (36.5 °C)  Pulse:  [70-93] 70  Resp:  [17-20] 17  SpO2:  [97 %-99 %] 98 %  BP: ()/(55-81) 98/63     Weight: 68 kg (150 lb)  Body mass index is 22.81 kg/m².    Physical Exam   Constitutional: He is oriented to person, place, and time. No distress.   HENT:   Head: Atraumatic.   Eyes: Pupils are equal, round, and reactive to light.   Neck: Normal range of motion. Neck supple.   Cardiovascular: Normal rate and regular rhythm.    Pulmonary/Chest: Effort normal and breath sounds normal.   Abdominal: Soft. Bowel sounds are normal.   Musculoskeletal: Normal range of motion. He exhibits edema.   Neurological: He is oriented to person, place, and time. No cranial nerve deficit. Coordination normal.   Skin: Skin is warm and dry. He is not diaphoretic.   Right tight abscess,swelling,redness,tendeness   Psychiatric: He has a normal mood and affect. His behavior is normal.        Significant Labs:   BMP:     Recent Labs  Lab 05/25/17  0319     102     136   K 4.3  4.3     106   CO2 22*  22*   BUN 14  14   CREATININE 1.2  1.2   CALCIUM 8.5*  8.5*     CBC:     Recent Labs  Lab 05/24/17  1100 05/25/17  0319   WBC 4.86 5.61   HGB 16.8 15.3   HCT 48.3 44.5    280           Assessment/Plan:      * Abscess of leg, right    S/P I&D in ER,culture has been done,will continue with broad spectrum IV Abx,follow cultures.still is swollen,consult surgery,NPO at this time.          Cellulitis of right lower extremity    On IV Abx as above.          HIV disease    Continue with home HIV medication.has been followed by IF in [place].          Rash of body    Started yesterday,could not be duo to IV Abx,also PO Abx was started few days back,started on kenalog oitment.            VTE Risk Mitigation         Ordered     Medium Risk of VTE  Once      05/24/17 1220          Tano  MD Mee  Department of Hospital Medicine   [facility]

## 2017-05-25 NOTE — PROGRESS NOTES
Medicated prior to wound care. Right thigh wound care done. Cleansed with NS then packed with iodoform guaze and dressed with gauze and paper tape. Still hard but less tender and less red. Tolerated procedure well.

## 2017-05-26 LAB
ANION GAP SERPL CALC-SCNC: 6 MMOL/L
ANION GAP SERPL CALC-SCNC: 6 MMOL/L
BASOPHILS # BLD AUTO: 0.02 K/UL
BASOPHILS NFR BLD: 0.3 %
BUN SERPL-MCNC: 15 MG/DL
BUN SERPL-MCNC: 15 MG/DL
CALCIUM SERPL-MCNC: 8.8 MG/DL
CALCIUM SERPL-MCNC: 8.8 MG/DL
CHLORIDE SERPL-SCNC: 106 MMOL/L
CHLORIDE SERPL-SCNC: 106 MMOL/L
CO2 SERPL-SCNC: 25 MMOL/L
CO2 SERPL-SCNC: 25 MMOL/L
CREAT SERPL-MCNC: 1 MG/DL
CREAT SERPL-MCNC: 1 MG/DL
DIFFERENTIAL METHOD: ABNORMAL
EOSINOPHIL # BLD AUTO: 0.3 K/UL
EOSINOPHIL NFR BLD: 4.3 %
ERYTHROCYTE [DISTWIDTH] IN BLOOD BY AUTOMATED COUNT: 12.9 %
EST. GFR  (AFRICAN AMERICAN): >60 ML/MIN/1.73 M^2
EST. GFR  (AFRICAN AMERICAN): >60 ML/MIN/1.73 M^2
EST. GFR  (NON AFRICAN AMERICAN): >60 ML/MIN/1.73 M^2
EST. GFR  (NON AFRICAN AMERICAN): >60 ML/MIN/1.73 M^2
GLUCOSE SERPL-MCNC: 97 MG/DL
GLUCOSE SERPL-MCNC: 97 MG/DL
HCT VFR BLD AUTO: 44.5 %
HGB BLD-MCNC: 15.4 G/DL
LYMPHOCYTES # BLD AUTO: 1.9 K/UL
LYMPHOCYTES NFR BLD: 31 %
MCH RBC QN AUTO: 30.4 PG
MCHC RBC AUTO-ENTMCNC: 34.6 %
MCV RBC AUTO: 88 FL
MONOCYTES # BLD AUTO: 0.7 K/UL
MONOCYTES NFR BLD: 11.5 %
NEUTROPHILS # BLD AUTO: 3.2 K/UL
NEUTROPHILS NFR BLD: 52.1 %
PLATELET # BLD AUTO: 276 K/UL
PMV BLD AUTO: 8.9 FL
POTASSIUM SERPL-SCNC: 4.1 MMOL/L
POTASSIUM SERPL-SCNC: 4.1 MMOL/L
RBC # BLD AUTO: 5.07 M/UL
RPR SER QL: NORMAL
SODIUM SERPL-SCNC: 137 MMOL/L
SODIUM SERPL-SCNC: 137 MMOL/L
VANCOMYCIN TROUGH SERPL-MCNC: 8.5 UG/ML
WBC # BLD AUTO: 6.1 K/UL

## 2017-05-26 PROCEDURE — 36415 COLL VENOUS BLD VENIPUNCTURE: CPT

## 2017-05-26 PROCEDURE — 63600175 PHARM REV CODE 636 W HCPCS: Performed by: EMERGENCY MEDICINE

## 2017-05-26 PROCEDURE — 80048 BASIC METABOLIC PNL TOTAL CA: CPT

## 2017-05-26 PROCEDURE — 25000003 PHARM REV CODE 250: Performed by: HOSPITALIST

## 2017-05-26 PROCEDURE — 85025 COMPLETE CBC W/AUTO DIFF WBC: CPT

## 2017-05-26 PROCEDURE — 25000003 PHARM REV CODE 250: Performed by: EMERGENCY MEDICINE

## 2017-05-26 PROCEDURE — 12000002 HC ACUTE/MED SURGE SEMI-PRIVATE ROOM

## 2017-05-26 PROCEDURE — 63600175 PHARM REV CODE 636 W HCPCS: Performed by: HOSPITALIST

## 2017-05-26 RX ADMIN — MORPHINE SULFATE 4 MG: 10 INJECTION INTRAVENOUS at 06:05

## 2017-05-26 RX ADMIN — VANCOMYCIN HYDROCHLORIDE 1000 MG: 1 INJECTION, POWDER, LYOPHILIZED, FOR SOLUTION INTRAVENOUS at 08:05

## 2017-05-26 RX ADMIN — Medication 3 ML: at 03:05

## 2017-05-26 RX ADMIN — VANCOMYCIN HYDROCHLORIDE 1000 MG: 1 INJECTION, POWDER, LYOPHILIZED, FOR SOLUTION INTRAVENOUS at 01:05

## 2017-05-26 RX ADMIN — Medication 3 ML: at 01:05

## 2017-05-26 RX ADMIN — Medication 3 ML: at 06:05

## 2017-05-26 RX ADMIN — VANCOMYCIN HYDROCHLORIDE 1000 MG: 1 INJECTION, POWDER, LYOPHILIZED, FOR SOLUTION INTRAVENOUS at 12:05

## 2017-05-26 NOTE — SUBJECTIVE & OBJECTIVE
Past Medical History:   Diagnosis Date    HIV disease     Syphilis        Past Surgical History:   Procedure Laterality Date    RECTAL SURGERY         Review of patient's allergies indicates:  No Known Allergies    No current facility-administered medications on file prior to encounter.      Current Outpatient Prescriptions on File Prior to Encounter   Medication Sig    desoximetasone (TOPICORT) 0.25 % Spry Apply topically 2 (two) times daily.     Family History     None        Social History Main Topics    Smoking status: Never Smoker    Smokeless tobacco: Not on file    Alcohol use 0.0 oz/week      Comment: occasionally    Drug use: No    Sexual activity: Not on file     Review of Systems   Constitutional: Negative for activity change, appetite change and fever.   HENT: Negative for congestion and drooling.    Eyes: Negative for discharge and itching.   Respiratory: Negative for apnea and chest tightness.    Cardiovascular: Negative for chest pain and leg swelling.   Gastrointestinal: Negative for abdominal distention and abdominal pain.   Musculoskeletal: Negative for arthralgias and back pain.   Skin:        Right tight abscess,swelling,redness,tendeness   Allergic/Immunologic: Negative for environmental allergies and food allergies.   Neurological: Negative for dizziness and facial asymmetry.   Hematological: Negative for adenopathy. Does not bruise/bleed easily.   Psychiatric/Behavioral: Negative for agitation and behavioral problems.     Objective:     Vital Signs (Most Recent):  Temp: 98.5 °F (36.9 °C) (05/26/17 0735)  Pulse: 72 (05/26/17 0735)  Resp: 18 (05/26/17 0735)  BP: 115/71 (05/26/17 0735)  SpO2: 96 % (05/26/17 0735) Vital Signs (24h Range):  Temp:  [98.4 °F (36.9 °C)-98.9 °F (37.2 °C)] 98.5 °F (36.9 °C)  Pulse:  [61-96] 72  Resp:  [17-18] 18  SpO2:  [96 %-98 %] 96 %  BP: (100-118)/(58-71) 115/71     Weight: 68 kg (150 lb)  Body mass index is 22.81 kg/m².    Physical Exam   Constitutional:  He is oriented to person, place, and time. No distress.   HENT:   Head: Atraumatic.   Eyes: Pupils are equal, round, and reactive to light.   Neck: Normal range of motion. Neck supple.   Cardiovascular: Normal rate and regular rhythm.    Pulmonary/Chest: Effort normal and breath sounds normal.   Abdominal: Soft. Bowel sounds are normal.   Musculoskeletal: Normal range of motion. He exhibits edema.   Neurological: He is oriented to person, place, and time. No cranial nerve deficit. Coordination normal.   Skin: Skin is warm and dry. He is not diaphoretic.   Right tight abscess,swelling,redness,tendeness   Psychiatric: He has a normal mood and affect. His behavior is normal.        Significant Labs:   BMP:     Recent Labs  Lab 05/26/17  0416   GLU 97  97     137   K 4.1  4.1     106   CO2 25  25   BUN 15  15   CREATININE 1.0  1.0   CALCIUM 8.8  8.8     CBC:     Recent Labs  Lab 05/24/17  1100 05/25/17  0319 05/26/17  0416   WBC 4.86 5.61 6.10   HGB 16.8 15.3 15.4   HCT 48.3 44.5 44.5    280 276

## 2017-05-26 NOTE — PROGRESS NOTES
Progress Note    Admit Date: 5/24/2017   LOS: 2 days     SUBJECTIVE:       Feeling better.  afebrile    OBJECTIVE:       Vital Signs Range (Last 24H):  Temp:  [98.4 °F (36.9 °C)-98.9 °F (37.2 °C)]   Pulse:  [61-96]   Resp:  [17-18]   BP: (100-118)/(58-69)   SpO2:  [96 %-98 %]     I & O (Last 24H):  Intake/Output Summary (Last 24 hours) at 05/26/17 0609  Last data filed at 05/25/17 1000   Gross per 24 hour   Intake              240 ml   Output                0 ml   Net              240 ml         Physical Exam:  NAD  R inner thigh w/ improving erythema and induration.    Packing removed, no significant drainage    Laboratory:  CBC:   Recent Labs  Lab 05/26/17  0416   WBC 6.10   RBC 5.07   HGB 15.4   HCT 44.5      MCV 88   MCH 30.4   MCHC 34.6     U/s w/out evidence of abscess        ASSESSMENT/PLAN:     R thigh soft tissue infection  - appears adequately drained  - continue BID packing changes  - defer abx to ID

## 2017-05-26 NOTE — ASSESSMENT & PLAN NOTE
Started yesterday,could not be duo to IV Abx,also PO Abx was started few days back,started on kenalog oitment.ID is concern for syphilis,staretd on IM PEN.

## 2017-05-26 NOTE — PROGRESS NOTES
Dressing change performed per MD order to R thigh incision. Small amount of bloody drainage. No odor noted. Redness/swelling improved. Tolerated well. No other issues at this time.

## 2017-05-26 NOTE — PLAN OF CARE
Problem: Patient Care Overview  Goal: Plan of Care Review   05/26/17 1849   Coping/Psychosocial   Plan Of Care Reviewed With patient   Pt is awake alert and oriented. Tolerating po intake. IV antibiotics as ordered. No distress noted. Continue with plan of care.     Problem: Fall Risk (Adult)  Goal: Absence of Falls  Patient will demonstrate the desired outcomes by discharge/transition of care.   Outcome: Ongoing (interventions implemented as appropriate)   05/26/17 1849   Fall Risk (Adult)   Absence of Falls making progress toward outcome   Hourly rounds, room across from station, call light in reach instructed to call for assist if needed.     Problem: Pain, Acute (Adult)  Goal: Acceptable Pain Control/Comfort Level  Patient will demonstrate the desired outcomes by discharge/transition of care.    05/26/17 1849   Pain, Acute (Adult)   Acceptable Pain Control/Comfort Level making progress toward outcome   Medicated once prior to right thigh dressing change as ordered.  Cleaned with wound cleanser, repacked with iodoform drsg as ordered, no active bleeding only on packing that was removed, hardened around site. Applied gauze on top of site after packing with medipore tape.  Pt tolerated well.

## 2017-05-26 NOTE — ASSESSMENT & PLAN NOTE
S/P I&D in ER,culture has been done,will continue with broad spectrum IV Abx,follow cultures.still is swollen,consulted surgery,US show no sign of fluid collection,no need for intervention,contiue with IV Abx with vanc and local wound care.

## 2017-05-26 NOTE — PROGRESS NOTES
[facility] Hospital Medicine  Progress Note    Patient Name: Raz Webster  MRN: [id number]  Patient Class: IP- Inpatient   Admission Date: 5/24/2017  Length of Stay: 2 days  Attending Physician: Tano Caban MD  Primary Care Provider: Primary Doctor No        Subjective:     Principal Problem:Abscess of leg, right    HPI:  This 31 y.o. M, who has a past medical history of HIV disease and Syphilis, presents to the ED for evaluation of an abscess to the right medial thigh, just distal to the groin, x4 days. He notes he was seen at the [facility] 3 days ago and had it lanced. He was given two shots of antibiotics and started on Bactrim, Clindamycin and Levofloxacin. Despite compliance with antibiotics,swelling and redness got worse,he notes the site is more  red, painful and hard. He denies fever, nausea, vomiting, dizziness, numbness, weakness and headache. He is compliant with HIV medications as well.does not know his CD 4 count,Patient had another I&D in ER,only blood came out,culture has been done and patient has been started on broad spectrum IV Abx for failed out patient management for right leg abscess with cellulitis.      Hospital Course:  This 31 y.o. M, who has a past medical history of HIV disease and Syphilis, presents to the ED for evaluation of an abscess to the right medial thigh, just distal to the groin, x4 days. He notes he was seen at the [facility] few  days ago and had it lanced. He was given two shots of antibiotics and started on Bactrim, Clindamycin and Levofloxacin. Despite compliance with antibiotics,swelling and redness got worse,he notes the site is more  red, painful and hard. He denies fever, nausea, vomiting, dizziness, numbness, weakness and headache. He is compliant with HIV medications as well.does not know his CD 4 count,he has been followed by ID in ,Patient had another I&D in ER,only blood came out,culture has been done and patient  has been started on broad spectrum IV Abx for failed out patient management for right leg abscess with cellulitis.right thigh is still swollen,consulted surgery for evaluation.US show no sign of fluid collection,no need for intervention,continue with IV Abx and wound care.swelling appear to be improving.  Has rash in body,started just yesetday,could not be duo to IV Abx,he started also PO A bx few days ago.started on kenalog.ID is concern for syphilis,started on IM PEN.      Past Medical History:   Diagnosis Date    HIV disease     Syphilis        Past Surgical History:   Procedure Laterality Date    RECTAL SURGERY         Review of patient's allergies indicates:  No Known Allergies    No current facility-administered medications on file prior to encounter.      Current Outpatient Prescriptions on File Prior to Encounter   Medication Sig    desoximetasone (TOPICORT) 0.25 % Spry Apply topically 2 (two) times daily.     Family History     None        Social History Main Topics    Smoking status: Never Smoker    Smokeless tobacco: Not on file    Alcohol use 0.0 oz/week      Comment: occasionally    Drug use: No    Sexual activity: Not on file     Review of Systems   Constitutional: Negative for activity change, appetite change and fever.   HENT: Negative for congestion and drooling.    Eyes: Negative for discharge and itching.   Respiratory: Negative for apnea and chest tightness.    Cardiovascular: Negative for chest pain and leg swelling.   Gastrointestinal: Negative for abdominal distention and abdominal pain.   Musculoskeletal: Negative for arthralgias and back pain.   Skin:        Right tight abscess,swelling,redness,tendeness   Allergic/Immunologic: Negative for environmental allergies and food allergies.   Neurological: Negative for dizziness and facial asymmetry.   Hematological: Negative for adenopathy. Does not bruise/bleed easily.   Psychiatric/Behavioral: Negative for agitation and behavioral  problems.     Objective:     Vital Signs (Most Recent):  Temp: 98.5 °F (36.9 °C) (05/26/17 0735)  Pulse: 72 (05/26/17 0735)  Resp: 18 (05/26/17 0735)  BP: 115/71 (05/26/17 0735)  SpO2: 96 % (05/26/17 0735) Vital Signs (24h Range):  Temp:  [98.4 °F (36.9 °C)-98.9 °F (37.2 °C)] 98.5 °F (36.9 °C)  Pulse:  [61-96] 72  Resp:  [17-18] 18  SpO2:  [96 %-98 %] 96 %  BP: (100-118)/(58-71) 115/71     Weight: 68 kg (150 lb)  Body mass index is 22.81 kg/m².    Physical Exam   Constitutional: He is oriented to person, place, and time. No distress.   HENT:   Head: Atraumatic.   Eyes: Pupils are equal, round, and reactive to light.   Neck: Normal range of motion. Neck supple.   Cardiovascular: Normal rate and regular rhythm.    Pulmonary/Chest: Effort normal and breath sounds normal.   Abdominal: Soft. Bowel sounds are normal.   Musculoskeletal: Normal range of motion. He exhibits edema.   Neurological: He is oriented to person, place, and time. No cranial nerve deficit. Coordination normal.   Skin: Skin is warm and dry. He is not diaphoretic.   Right tight abscess,swelling,redness,tendeness   Psychiatric: He has a normal mood and affect. His behavior is normal.        Significant Labs:   BMP:     Recent Labs  Lab 05/26/17  0416   GLU 97  97     137   K 4.1  4.1     106   CO2 25  25   BUN 15  15   CREATININE 1.0  1.0   CALCIUM 8.8  8.8     CBC:     Recent Labs  Lab 05/24/17  1100 05/25/17  0319 05/26/17  0416   WBC 4.86 5.61 6.10   HGB 16.8 15.3 15.4   HCT 48.3 44.5 44.5    280 276           Assessment/Plan:      * Abscess of leg, right    S/P I&D in ER,culture has been done,will continue with broad spectrum IV Abx,follow cultures.still is swollen,consulted surgery,US show no sign of fluid collection,no need for intervention,contiue with IV Abx with vanc and local wound care.          Cellulitis of right lower extremity    On IV Abx as above.          HIV disease    Continue with home HIV medication.has  been followed by ID in [place].          Rash of body    Started yesterday,could not be duo to IV Abx,also PO Abx was started few days back,started on kenalog oitment.ID is concern for syphilis,staretd on IM PEN.            VTE Risk Mitigation         Ordered     Medium Risk of VTE  Once      05/24/17 1220          Tano Caban MD  Department of Hospital Medicine   [facility]

## 2017-05-26 NOTE — CONSULTS
Consult Note  Infectious Disease    Consult Requested By: Tano Caban MD    Reason for Consult: rash and rt thigh abscess    SUBJECTIVE:     History of Present Illness:  Patient is a 31 y.o. male presents with nonhealing rt thigh abscess. He is hiv positive for 2 years now and is rx with descovy and prezcobix by . He does not know his cd4 count but states his numbers are heading in the wrong direction.he became ill with a rt thigh abscess about 8 days ago and had an i and  D in an urgent care clinic. He was rx with bactrim, levaquin and clindamycin but did not resolve. He developed a rash yesterday after 7 days of rx. He has had syphilis before and does admit to unprotected oral sex.an us of the rt thigh is planned, further i and  D carried out in er. Blood cultures are negative from 5/24/17.    Past Medical History:   Diagnosis Date    HIV disease     Syphilis      Past Surgical History:   Procedure Laterality Date    RECTAL SURGERY       History reviewed. No pertinent family history.  Social History   Substance Use Topics    Smoking status: Never Smoker    Smokeless tobacco: Not on file    Alcohol use 0.0 oz/week      Comment: occasionally       Review of patient's allergies indicates:  No Known Allergies     Antibiotics     Start     Stop Route Frequency Ordered    05/26/17 1130  vancomycin 1 g in dextrose 5 % 250 mL IVPB (ready to mix system)      -- IV Every 8 hours (non-standard times) 05/26/17 1036          Review of Systems:  Constitutional: no fever or chills  Eyes: no visual changes  ENT: no nasal congestion or sore throat  Respiratory: no cough or shortness of breath  Cardiovascular: no chest pain or palpitations  Gastrointestinal: no nausea or vomiting, no abdominal pain or change in bowel habits  Genitourinary: no hematuria or dysuria  Integument/Breast: rt thigh redness. rash all over body- not itchy  Musculoskeletal: no arthralgias or myalgias  Neurological: no seizures or  tremors    OBJECTIVE:     Vital Signs (Most Recent)  Temp: 98.5 °F (36.9 °C) (05/26/17 0735)  Pulse: 72 (05/26/17 0735)  Resp: 18 (05/26/17 0735)  BP: 115/71 (05/26/17 0735)  SpO2: 96 % (05/26/17 0735)    Temperature Range Min/Max (Last 24H):  Temp:  [98.5 °F (36.9 °C)-98.9 °F (37.2 °C)]     Physical Exam:  General: well developed, well nourished  HENT: Head:normocephalic, atraumatic. Ears:not examined. Nose: Nares normal. Septum midline. Mucosa normal. No drainage or sinus tenderness., no discharge. Throat: lips, mucosa, and tongue normal; teeth and gums normal and no throat erythema.  Eyes: conjunctivae/corneas clear. PERRL.   Neck: supple, symmetrical, trachea midline, no JVD and thyroid not enlarged, symmetric, no tenderness/mass/nodules  Lungs:  clear to auscultation bilaterally and normal respiratory effort  Cardiovascular: Heart: regular rate and rhythm, S1, S2 normal, no murmur, click, rub or gallop. Chest Wall: no tenderness. Extremities: no cyanosis or edema, or clubbing. Pulses: 2+ and symmetric.  Abdomen/Rectal: Abdomen: soft, non-tender non-distented; bowel sounds normal; no masses,  no organomegaly. Rectal: not examined  Skin: diffuse rash all over body with involvement of palms and soles  Musculoskeletal:no clubbing, cyanosis  Lymph Nodes: No cervical or supraclavicular adenopathy    Laboratory:  CBC    Recent Labs  Lab 05/26/17  0416   WBC 6.10   RBC 5.07   HGB 15.4   HCT 44.5        BMP    Recent Labs  Lab 05/26/17  0416   CO2 25  25   BUN 15  15   CREATININE 1.0  1.0   CALCIUM 8.8  8.8     No results for input(s): COLORU, CLARITYU, SPECGRAV, PHUR, PROTEINUA, GLUCOSEU, BILIRUBINCON, BLOODU, WBCU, RBCU, BACTERIA, MUCUS, NITRITE, LEUKOCYTESUR, UROBILINOGEN, HYALINECASTS in the last 168 hours.  Microbiology Results (last 7 days)     Procedure Component Value Units Date/Time    Aerobic culture #1 [[id number]] Collected:  05/24/17 1200    Order Status:  Completed Specimen:  Abscess from Abscess  Updated:  05/26/17 0905     Aerobic Bacterial Culture --     STAPHYLOCOCCUS AUREUS  Few  Susceptibility pending      Narrative:       Culture #1    Blood culture [[id number]] Collected:  05/24/17 1128    Order Status:  Completed Specimen:  Blood from Peripheral, Hand, Right Updated:  05/25/17 1503     Blood Culture, Routine No Growth to date     Blood Culture, Routine No Growth to date    Blood culture [[id number]] Collected:  05/24/17 1144    Order Status:  Completed Specimen:  Blood from Peripheral, Hand, Left Updated:  05/25/17 1303     Blood Culture, Routine No Growth to date     Blood Culture, Routine No Growth to date          Diagnostic Results:  Labs: Reviewed  X-Ray: Reviewed    ASSESSMENT/PLAN:     Active Hospital Problems    Diagnosis  POA    *Abscess of leg, right [L02.415]  Yes    Rash of body [R21]  Yes    Cellulitis of right lower extremity [L03.115]  Yes    HIV disease [B20]  Yes      Resolved Hospital Problems    Diagnosis Date Resolved POA   No resolved problems to display.       1. Rt thigh abscess  Suspect staph aureus  2. Diffuse rash- concern for syphilis. rpr pending. i gave him 1 shot of benzathine penicillin yesterday. He will fu with id md and if serology is positive will get further rx  If serology is negative, he is likely allergic to bactrim  3. Hiv, cd4 unknown. Continue haart and fu with primary id md

## 2017-05-26 NOTE — PLAN OF CARE
Problem: Patient Care Overview  Goal: Individualization & Mutuality  Outcome: Ongoing (interventions implemented as appropriate)  Pt progressing. Oriented x4. Voiding well. Skin integrity maintained. Pain controlled. VS stable. Adequate oral intake. Tolerating diet. IV fluids/antibiotics maintained. Free of falls. Call light in reach. Low bed. No issues during shift. Continue plan of care.        Problem: HIV/AIDS (Adult)  Goal: Signs and Symptoms of Listed Potential Problems Will be Absent, Minimized or Managed (HIV/AIDS)  Signs and symptoms of listed potential problems will be absent, minimized or managed by discharge/transition of care (reference HIV/AIDS (Adult) CPG).   Outcome: Ongoing (interventions implemented as appropriate)  Pt's home HIV medication given.      Problem: Skin and Soft Tissue Infection (Adult)  Goal: Signs and Symptoms of Listed Potential Problems Will be Absent, Minimized or Managed (Skin and Soft Tissue Infection)  Signs and symptoms of listed potential problems will be absent, minimized or managed by discharge/transition of care (reference Skin and Soft Tissue Infection (Adult) CPG).   Outcome: Ongoing (interventions implemented as appropriate)  Wound care will be performed. Iv antibiotics maintained    Problem: Fall Risk (Adult)  Goal: Identify Related Risk Factors and Signs and Symptoms  Related risk factors and signs and symptoms are identified upon initiation of Human Response Clinical Practice Guideline (CPG)   Outcome: Ongoing (interventions implemented as appropriate)  Free of falls.      Problem: Pain, Acute (Adult)  Goal: Identify Related Risk Factors and Signs and Symptoms  Related risk factors and signs and symptoms are identified upon initiation of Human Response Clinical Practice Guideline (CPG)   Outcome: Ongoing (interventions implemented as appropriate)  No reports of pain at this time.

## 2017-05-27 LAB
ANION GAP SERPL CALC-SCNC: 6 MMOL/L
ANION GAP SERPL CALC-SCNC: 6 MMOL/L
BACTERIA SPEC AEROBE CULT: NORMAL
BACTERIA SPEC AEROBE CULT: NORMAL
BASOPHILS # BLD AUTO: 0.02 K/UL
BASOPHILS NFR BLD: 0.3 %
BUN SERPL-MCNC: 13 MG/DL
BUN SERPL-MCNC: 13 MG/DL
CALCIUM SERPL-MCNC: 8.9 MG/DL
CALCIUM SERPL-MCNC: 8.9 MG/DL
CHLORIDE SERPL-SCNC: 104 MMOL/L
CHLORIDE SERPL-SCNC: 104 MMOL/L
CO2 SERPL-SCNC: 27 MMOL/L
CO2 SERPL-SCNC: 27 MMOL/L
CREAT SERPL-MCNC: 0.9 MG/DL
CREAT SERPL-MCNC: 0.9 MG/DL
DIFFERENTIAL METHOD: ABNORMAL
EOSINOPHIL # BLD AUTO: 0.2 K/UL
EOSINOPHIL NFR BLD: 2.8 %
ERYTHROCYTE [DISTWIDTH] IN BLOOD BY AUTOMATED COUNT: 12.8 %
EST. GFR  (AFRICAN AMERICAN): >60 ML/MIN/1.73 M^2
EST. GFR  (AFRICAN AMERICAN): >60 ML/MIN/1.73 M^2
EST. GFR  (NON AFRICAN AMERICAN): >60 ML/MIN/1.73 M^2
EST. GFR  (NON AFRICAN AMERICAN): >60 ML/MIN/1.73 M^2
GLUCOSE SERPL-MCNC: 95 MG/DL
GLUCOSE SERPL-MCNC: 95 MG/DL
HCT VFR BLD AUTO: 44.5 %
HGB BLD-MCNC: 15.1 G/DL
LYMPHOCYTES # BLD AUTO: 1.9 K/UL
LYMPHOCYTES NFR BLD: 26.7 %
MCH RBC QN AUTO: 30.1 PG
MCHC RBC AUTO-ENTMCNC: 33.9 %
MCV RBC AUTO: 89 FL
MONOCYTES # BLD AUTO: 0.6 K/UL
MONOCYTES NFR BLD: 9 %
NEUTROPHILS # BLD AUTO: 4.3 K/UL
NEUTROPHILS NFR BLD: 60.8 %
PLATELET # BLD AUTO: 268 K/UL
PMV BLD AUTO: 8.7 FL
POTASSIUM SERPL-SCNC: 4 MMOL/L
POTASSIUM SERPL-SCNC: 4 MMOL/L
RBC # BLD AUTO: 5.02 M/UL
SODIUM SERPL-SCNC: 137 MMOL/L
SODIUM SERPL-SCNC: 137 MMOL/L
VANCOMYCIN TROUGH SERPL-MCNC: 16.7 UG/ML
WBC # BLD AUTO: 7.11 K/UL

## 2017-05-27 PROCEDURE — 85025 COMPLETE CBC W/AUTO DIFF WBC: CPT

## 2017-05-27 PROCEDURE — 63600175 PHARM REV CODE 636 W HCPCS: Performed by: EMERGENCY MEDICINE

## 2017-05-27 PROCEDURE — 36415 COLL VENOUS BLD VENIPUNCTURE: CPT

## 2017-05-27 PROCEDURE — 80202 ASSAY OF VANCOMYCIN: CPT

## 2017-05-27 PROCEDURE — 25000003 PHARM REV CODE 250: Performed by: EMERGENCY MEDICINE

## 2017-05-27 PROCEDURE — 25000003 PHARM REV CODE 250: Performed by: HOSPITALIST

## 2017-05-27 PROCEDURE — 63600175 PHARM REV CODE 636 W HCPCS: Performed by: HOSPITALIST

## 2017-05-27 PROCEDURE — 80048 BASIC METABOLIC PNL TOTAL CA: CPT

## 2017-05-27 PROCEDURE — 12000002 HC ACUTE/MED SURGE SEMI-PRIVATE ROOM

## 2017-05-27 RX ADMIN — VANCOMYCIN HYDROCHLORIDE 1000 MG: 1 INJECTION, POWDER, LYOPHILIZED, FOR SOLUTION INTRAVENOUS at 04:05

## 2017-05-27 RX ADMIN — VANCOMYCIN HYDROCHLORIDE 1000 MG: 1 INJECTION, POWDER, LYOPHILIZED, FOR SOLUTION INTRAVENOUS at 02:05

## 2017-05-27 RX ADMIN — VANCOMYCIN HYDROCHLORIDE 1000 MG: 1 INJECTION, POWDER, LYOPHILIZED, FOR SOLUTION INTRAVENOUS at 10:05

## 2017-05-27 RX ADMIN — MORPHINE SULFATE 4 MG: 10 INJECTION INTRAVENOUS at 03:05

## 2017-05-27 RX ADMIN — Medication 3 ML: at 10:05

## 2017-05-27 RX ADMIN — Medication 3 ML: at 02:05

## 2017-05-27 RX ADMIN — Medication 3 ML: at 05:05

## 2017-05-27 RX ADMIN — MORPHINE SULFATE 4 MG: 10 INJECTION INTRAVENOUS at 05:05

## 2017-05-27 NOTE — PROGRESS NOTES
.  Progress Note  Infectious Disease    Admit Date: 5/24/2017   LOS: 3 days     SUBJECTIVE:     Follow-up For:  Right thigh abscess.    Antibiotics     Start     Stop Route Frequency Ordered    05/26/17 1130  vancomycin 1 g in dextrose 5 % 250 mL IVPB (ready to mix system)      -- IV Every 8 hours (non-standard times) 05/26/17 1036              OBJECTIVE:     Vital Signs (Most Recent)  Temp: 97.8 °F (36.6 °C) (05/27/17 1100)  Pulse: 79 (05/27/17 1100)  Resp: 18 (05/27/17 1100)  BP: 103/66 (05/27/17 1100)  SpO2: 97 % (05/27/17 1100)    Temperature Range Min/Max (Last 24H):  Temp:  [97.7 °F (36.5 °C)-98.2 °F (36.8 °C)]     I & O (Last 24H):  Intake/Output Summary (Last 24 hours) at 05/27/17 1447  Last data filed at 05/27/17 0900   Gross per 24 hour   Intake             1220 ml   Output                0 ml   Net             1220 ml       Lines/Drains:       Peripheral IV - Single Lumen 05/24/17 1100 Left Antecubital (Active)   Site Assessment Clean;Dry;Intact;No redness 5/26/2017  8:00 AM   Line Status Saline locked 5/26/2017  8:00 AM   Dressing Status Clean;Dry;Intact 5/26/2017  8:00 AM   Dressing Change Due 05/28/17 5/26/2017  8:00 AM   Site Change Due 05/28/17 5/26/2017  8:00 AM   Reason Not Rotated Not due 5/25/2017  8:00 AM       Laboratory:  Recent Results (from the past 24 hour(s))   Basic Metabolic Panel (BMP)    Collection Time: 05/27/17  3:58 AM   Result Value Ref Range    Sodium 137 136 - 145 mmol/L    Potassium 4.0 3.5 - 5.1 mmol/L    Chloride 104 95 - 110 mmol/L    CO2 27 23 - 29 mmol/L    Glucose 95 70 - 110 mg/dL    BUN, Bld 13 6 - 20 mg/dL    Creatinine 0.9 0.5 - 1.4 mg/dL    Calcium 8.9 8.7 - 10.5 mg/dL    Anion Gap 6 (L) 8 - 16 mmol/L    eGFR if African American >60 >60 mL/min/1.73 m^2    eGFR if non African American >60 >60 mL/min/1.73 m^2   CBC with Automated Differential    Collection Time: 05/27/17  3:58 AM   Result Value Ref Range    WBC 7.11 3.90 - 12.70 K/uL    RBC 5.02 4.60 - 6.20 M/uL     Hemoglobin 15.1 14.0 - 18.0 g/dL    Hematocrit 44.5 40.0 - 54.0 %    MCV 89 82 - 98 fL    MCH 30.1 27.0 - 31.0 pg    MCHC 33.9 32.0 - 36.0 %    RDW 12.8 11.5 - 14.5 %    Platelets 268 150 - 350 K/uL    MPV 8.7 (L) 9.2 - 12.9 fL    Gran # 4.3 1.8 - 7.7 K/uL    Lymph # 1.9 1.0 - 4.8 K/uL    Mono # 0.6 0.3 - 1.0 K/uL    Eos # 0.2 0.0 - 0.5 K/uL    Baso # 0.02 0.00 - 0.20 K/uL    Gran% 60.8 38.0 - 73.0 %    Lymph% 26.7 18.0 - 48.0 %    Mono% 9.0 4.0 - 15.0 %    Eosinophil% 2.8 0.0 - 8.0 %    Basophil% 0.3 0.0 - 1.9 %    Differential Method Automated    Basic metabolic panel    Collection Time: 05/27/17  3:58 AM   Result Value Ref Range    Sodium 137 136 - 145 mmol/L    Potassium 4.0 3.5 - 5.1 mmol/L    Chloride 104 95 - 110 mmol/L    CO2 27 23 - 29 mmol/L    Glucose 95 70 - 110 mg/dL    BUN, Bld 13 6 - 20 mg/dL    Creatinine 0.9 0.5 - 1.4 mg/dL    Calcium 8.9 8.7 - 10.5 mg/dL    Anion Gap 6 (L) 8 - 16 mmol/L    eGFR if African American >60 >60 mL/min/1.73 m^2    eGFR if non African American >60 >60 mL/min/1.73 m^2   VANCOMYCIN, TROUGH before 4th dose    Collection Time: 05/27/17 11:38 AM   Result Value Ref Range    Vancomycin-Trough 16.7 10.0 - 22.0 ug/mL       Micro:  Microbiology Results (last 7 days)     Procedure Component Value Units Date/Time    Blood culture [[id number]] Collected:  05/24/17 1144    Order Status:  Completed Specimen:  Blood from Peripheral, Hand, Left Updated:  05/27/17 1303     Blood Culture, Routine No Growth to date     Blood Culture, Routine No Growth to date     Blood Culture, Routine No Growth to date     Blood Culture, Routine No Growth to date    Aerobic culture #1 [[id number]]  (Susceptibility) Collected:  05/24/17 1200    Order Status:  Completed Specimen:  Abscess from Abscess Updated:  05/27/17 0845     Aerobic Bacterial Culture called to [facility] 05/27/2017  08:45     Aerobic Bacterial Culture --     METHICILLIN RESISTANT STAPHYLOCOCCUS AUREUS  Few      Narrative:        Culture #1    Blood culture [[id number]] Collected:  05/24/17 1128    Order Status:  Completed Specimen:  Blood from Peripheral, Hand, Right Updated:  05/26/17 1503     Blood Culture, Routine No Growth to date     Blood Culture, Routine No Growth to date     Blood Culture, Routine No Growth to date              ASSESSMENT/PLAN:     Active Hospital Problems    Diagnosis  POA    *Abscess of leg, right [L02.415]  Yes    Rash of body [R21]  Yes    Cellulitis of right lower extremity [L03.115]  Yes    HIV disease [B20]  Yes      Resolved Hospital Problems    Diagnosis Date Resolved POA   No resolved problems to display.         Physical Exam:  Gen: NAD; doing well, brother at the bedside  Pul: CTA   Cardio:  +S1+S2  Abd: Soft, NT  Ext: No edema  Skin: dressing not taken down      IMPRESSION;  1) HIV (CD4 unknown).  2) Right thigh abscess.  3) Diffuse rash.    RECOMMENDATIONS;  1) Cont with the Descovy and Prezcobix.  2) Cont with the vanc; trough looking good.  3) RPR is negative; rash likely TMP-SMX allergy.  4) We will cont to follow.

## 2017-05-27 NOTE — PROGRESS NOTES
Dressing changed performed per order. Small amount of bloody drainage. No odor. Redness/swelling greatly improved. Still hard around incision site, however much improved from yesterday. Tolerated well. No other issues at this time.

## 2017-05-27 NOTE — PLAN OF CARE
Problem: Patient Care Overview  Goal: Individualization & Mutuality  Outcome: Ongoing (interventions implemented as appropriate)  Pt progressing. Oriented x4. Voiding well. Skin integrity maintained. Pain controlled. VS stable. Adequate oral intake. Tolerating diet. IV antibiotics maintained. Free of falls. Call light in reach. Low bed. No issues during shift. Continue plan of care.        Problem: HIV/AIDS (Adult)  Goal: Signs and Symptoms of Listed Potential Problems Will be Absent, Minimized or Managed (HIV/AIDS)  Signs and symptoms of listed potential problems will be absent, minimized or managed by discharge/transition of care (reference HIV/AIDS (Adult) CPG).   Outcome: Ongoing (interventions implemented as appropriate)  Pt's home HIV medication given.      Problem: Skin and Soft Tissue Infection (Adult)  Goal: Signs and Symptoms of Listed Potential Problems Will be Absent, Minimized or Managed (Skin and Soft Tissue Infection)  Signs and symptoms of listed potential problems will be absent, minimized or managed by discharge/transition of care (reference Skin and Soft Tissue Infection (Adult) CPG).   Outcome: Ongoing (interventions implemented as appropriate)  Wound care will be performed. Iv antibiotics maintained     Problem: Fall Risk (Adult)  Goal: Identify Related Risk Factors and Signs and Symptoms  Related risk factors and signs and symptoms are identified upon initiation of Human Response Clinical Practice Guideline (CPG)   Outcome: Ongoing (interventions implemented as appropriate)  Free of falls.      Problem: Pain, Acute (Adult)  Goal: Identify Related Risk Factors and Signs and Symptoms  Related risk factors and signs and symptoms are identified upon initiation of Human Response Clinical Practice Guideline (CPG)   Outcome: Ongoing (interventions implemented as appropriate)  No reports of pain at this time.

## 2017-05-28 LAB
ANION GAP SERPL CALC-SCNC: 9 MMOL/L
ANION GAP SERPL CALC-SCNC: 9 MMOL/L
BASOPHILS # BLD AUTO: 0.02 K/UL
BASOPHILS NFR BLD: 0.3 %
BUN SERPL-MCNC: 12 MG/DL
BUN SERPL-MCNC: 12 MG/DL
CALCIUM SERPL-MCNC: 8.9 MG/DL
CALCIUM SERPL-MCNC: 8.9 MG/DL
CHLORIDE SERPL-SCNC: 103 MMOL/L
CHLORIDE SERPL-SCNC: 103 MMOL/L
CO2 SERPL-SCNC: 28 MMOL/L
CO2 SERPL-SCNC: 28 MMOL/L
CREAT SERPL-MCNC: 0.9 MG/DL
CREAT SERPL-MCNC: 0.9 MG/DL
DIFFERENTIAL METHOD: ABNORMAL
EOSINOPHIL # BLD AUTO: 0.2 K/UL
EOSINOPHIL NFR BLD: 3.3 %
ERYTHROCYTE [DISTWIDTH] IN BLOOD BY AUTOMATED COUNT: 12.7 %
EST. GFR  (AFRICAN AMERICAN): >60 ML/MIN/1.73 M^2
EST. GFR  (AFRICAN AMERICAN): >60 ML/MIN/1.73 M^2
EST. GFR  (NON AFRICAN AMERICAN): >60 ML/MIN/1.73 M^2
EST. GFR  (NON AFRICAN AMERICAN): >60 ML/MIN/1.73 M^2
GLUCOSE SERPL-MCNC: 92 MG/DL
GLUCOSE SERPL-MCNC: 92 MG/DL
HCT VFR BLD AUTO: 45.7 %
HGB BLD-MCNC: 15.3 G/DL
LYMPHOCYTES # BLD AUTO: 1.6 K/UL
LYMPHOCYTES NFR BLD: 22 %
MCH RBC QN AUTO: 30.1 PG
MCHC RBC AUTO-ENTMCNC: 33.5 %
MCV RBC AUTO: 90 FL
MONOCYTES # BLD AUTO: 0.6 K/UL
MONOCYTES NFR BLD: 8.7 %
NEUTROPHILS # BLD AUTO: 4.8 K/UL
NEUTROPHILS NFR BLD: 65.7 %
PLATELET # BLD AUTO: 245 K/UL
PMV BLD AUTO: 8.7 FL
POTASSIUM SERPL-SCNC: 4.1 MMOL/L
POTASSIUM SERPL-SCNC: 4.1 MMOL/L
RBC # BLD AUTO: 5.08 M/UL
SODIUM SERPL-SCNC: 140 MMOL/L
SODIUM SERPL-SCNC: 140 MMOL/L
WBC # BLD AUTO: 7.35 K/UL

## 2017-05-28 PROCEDURE — 85025 COMPLETE CBC W/AUTO DIFF WBC: CPT

## 2017-05-28 PROCEDURE — 63600175 PHARM REV CODE 636 W HCPCS: Performed by: HOSPITALIST

## 2017-05-28 PROCEDURE — 80048 BASIC METABOLIC PNL TOTAL CA: CPT

## 2017-05-28 PROCEDURE — 63600175 PHARM REV CODE 636 W HCPCS: Performed by: EMERGENCY MEDICINE

## 2017-05-28 PROCEDURE — 36415 COLL VENOUS BLD VENIPUNCTURE: CPT

## 2017-05-28 PROCEDURE — 12000002 HC ACUTE/MED SURGE SEMI-PRIVATE ROOM

## 2017-05-28 PROCEDURE — 25000003 PHARM REV CODE 250: Performed by: EMERGENCY MEDICINE

## 2017-05-28 PROCEDURE — 25000003 PHARM REV CODE 250: Performed by: HOSPITALIST

## 2017-05-28 RX ADMIN — Medication 3 ML: at 06:05

## 2017-05-28 RX ADMIN — MORPHINE SULFATE 4 MG: 10 INJECTION INTRAVENOUS at 05:05

## 2017-05-28 RX ADMIN — VANCOMYCIN HYDROCHLORIDE 1000 MG: 1 INJECTION, POWDER, LYOPHILIZED, FOR SOLUTION INTRAVENOUS at 02:05

## 2017-05-28 RX ADMIN — Medication 3 ML: at 02:05

## 2017-05-28 RX ADMIN — MORPHINE SULFATE 4 MG: 10 INJECTION INTRAVENOUS at 04:05

## 2017-05-28 RX ADMIN — VANCOMYCIN HYDROCHLORIDE 1000 MG: 1 INJECTION, POWDER, LYOPHILIZED, FOR SOLUTION INTRAVENOUS at 06:05

## 2017-05-28 RX ADMIN — VANCOMYCIN HYDROCHLORIDE 1000 MG: 1 INJECTION, POWDER, LYOPHILIZED, FOR SOLUTION INTRAVENOUS at 09:05

## 2017-05-28 NOTE — PLAN OF CARE
Problem: Patient Care Overview  Goal: Plan of Care Review  Outcome: Ongoing (interventions implemented as appropriate)  Patient continues with antibiotic therapy,  And wound care every 12 hours.   Patient anxious to be discharged,  And return to work.    Patient has a strong family support system,   And will have help if needed after discharge;.

## 2017-05-28 NOTE — PLAN OF CARE
Problem: Patient Care Overview  Goal: Plan of Care Review  Outcome: Ongoing (interventions implemented as appropriate)  Patient reports minimal pain at rest, states pain most severe during wound care.  Pre-medicated with PRN morphine prior to completing wound care.  VS stable.  Vancomycin administered per MD order. Patient able to rest comfortably overnight.  Will continue to monitor.

## 2017-05-28 NOTE — ASSESSMENT & PLAN NOTE
S/P I&D in ER with Surgical evaluation with U/S with no need for repeat intervention. Wound culture with MRSA, on Vancomycin as per ID, improving.

## 2017-05-28 NOTE — PROGRESS NOTES
[facility] Hospital Medicine  Progress Note    Patient Name: Raz Webster  MRN: [id number]  Patient Class: IP- Inpatient   Admission Date: 5/24/2017  Length of Stay: 4 days  Attending Physician: Olivia Bolivar MD  Primary Care Provider: Primary Doctor No        Subjective:     Principal Problem:Abscess of leg, right    HPI:  This 31 y.o. M, who has a past medical history of HIV disease and Syphilis, presents to the ED for evaluation of an abscess to the right medial thigh, just distal to the groin, x4 days. He notes he was seen at the [facility] 3 days ago and had it lanced. He was given two shots of antibiotics and started on Bactrim, Clindamycin and Levofloxacin. Despite compliance with antibiotics,swelling and redness got worse,he notes the site is more  red, painful and hard. He denies fever, nausea, vomiting, dizziness, numbness, weakness and headache. He is compliant with HIV medications as well.does not know his CD 4 count,Patient had another I&D in ER,only blood came out,culture has been done and patient has been started on broad spectrum IV Abx for failed out patient management for right leg abscess with cellulitis.      Hospital Course:  Mr. Webster was admitted for failed out patient management for right thigh abscess with cellulitis. Pt was started on broad spectrum antibiotics and was seen by Surgery. U/S showed no sign of fluid collection and Surgery felt no further intervention was necessary with only recommendation for daily wound care. Pt did have a rash develop that was thought to be related to Bactrim. CD4 count is pending and ID following for MRSA cellulitis. Pt currently on Vancomycin.    Interval History: Pt report right thigh site about the same but rash better around abdomen.    Review of Systems   Constitutional: Negative for chills and fever.   HENT: Negative for sore throat.    Respiratory: Negative for cough and shortness of breath.    Cardiovascular: Negative for  chest pain.     Objective:     Vital Signs (Most Recent):  Temp: 97.6 °F (36.4 °C) (05/28/17 1339)  Pulse: 77 (05/28/17 1339)  Resp: 18 (05/28/17 1339)  BP: 105/66 (05/28/17 1339)  SpO2: 97 % (05/28/17 1339) Vital Signs (24h Range):  Temp:  [97.4 °F (36.3 °C)-98.2 °F (36.8 °C)] 97.6 °F (36.4 °C)  Pulse:  [75-82] 77  Resp:  [18] 18  SpO2:  [96 %-98 %] 97 %  BP: (105-110)/(64-72) 105/66     Weight: 68 kg (150 lb)  Body mass index is 22.81 kg/m².    Intake/Output Summary (Last 24 hours) at 05/28/17 1659  Last data filed at 05/28/17 1341   Gross per 24 hour   Intake              840 ml   Output                0 ml   Net              840 ml      Physical Exam   Constitutional: He is oriented to person, place, and time. He appears well-developed and well-nourished.   HENT:   Head: Normocephalic and atraumatic.   Eyes: EOM are normal. Pupils are equal, round, and reactive to light.   Neck: Normal range of motion. Neck supple.   Cardiovascular: Normal rate and regular rhythm.    Pulmonary/Chest: Effort normal and breath sounds normal.   Abdominal: Soft. Bowel sounds are normal.   Musculoskeletal: Normal range of motion.   Neurological: He is alert and oriented to person, place, and time.   Skin: Skin is warm and dry.   Right anterior medial thigh with I&D site with packing in place, induration but minimal erythema.  Macular rash covering anterior abdomen with decrease.   Psychiatric: He has a normal mood and affect. His behavior is normal.       Significant Labs: All pertinent labs within the past 24 hours have been reviewed.    Significant Imaging: I have reviewed and interpreted all pertinent imaging results/findings within the past 24 hours.    Assessment/Plan:      * Abscess of leg, right    Pt s/p I&D done in the ER and he was evaluated by Surgery with U/S with no need for repeat intervention. Wound culture with MRSA, on Vancomycin as per ID, improving.        Rash of body    Improving, possibly due to Bactrim as per  ID, continue to monitor.        HIV disease    Continue with home HIV medication. Followed by ID in [facility]. CD4 count pending. RPR negative.        Cellulitis of right lower extremity    As discussed above. Will defer transition to PO to ID.          VTE Risk Mitigation         Ordered     Medium Risk of VTE  Once      05/24/17 1220          Olivia Bolivar MD  Department of Hospital Medicine   [facility]

## 2017-05-28 NOTE — SUBJECTIVE & OBJECTIVE
Interval History: Pt report right leg better.    Review of Systems   Constitutional: Negative for chills and fever.   HENT: Negative for sore throat.    Respiratory: Negative for cough and shortness of breath.    Cardiovascular: Negative for chest pain.     Objective:     Vital Signs (Most Recent):  Temp: 98.2 °F (36.8 °C) (05/27/17 2019)  Pulse: 82 (05/27/17 2019)  Resp: 18 (05/27/17 2019)  BP: 109/72 (05/27/17 2019)  SpO2: 98 % (05/27/17 2019) Vital Signs (24h Range):  Temp:  [97.7 °F (36.5 °C)-98.4 °F (36.9 °C)] 98.2 °F (36.8 °C)  Pulse:  [70-90] 82  Resp:  [18] 18  SpO2:  [97 %-98 %] 98 %  BP: ()/(58-72) 109/72     Weight: 68 kg (150 lb)  Body mass index is 22.81 kg/m².    Intake/Output Summary (Last 24 hours) at 05/27/17 0016  Last data filed at 05/27/17 1800   Gross per 24 hour   Intake             1210 ml   Output                0 ml   Net             1210 ml      Physical Exam   Constitutional: He is oriented to person, place, and time. He appears well-developed and well-nourished.   HENT:   Head: Normocephalic and atraumatic.   Eyes: EOM are normal. Pupils are equal, round, and reactive to light.   Neck: Normal range of motion. Neck supple.   Cardiovascular: Normal rate and regular rhythm.    Pulmonary/Chest: Effort normal and breath sounds normal.   Abdominal: Soft. Bowel sounds are normal.   Musculoskeletal: Normal range of motion.   Neurological: He is alert and oriented to person, place, and time.   Skin: Skin is warm and dry.   Right anterior medial thigh with I&D site with packing in place, induration but minimal erythema    Psychiatric: He has a normal mood and affect. His behavior is normal.       Significant Labs: All pertinent labs within the past 24 hours have been reviewed.    Significant Imaging: I have reviewed and interpreted all pertinent imaging results/findings within the past 24 hours.

## 2017-05-28 NOTE — SUBJECTIVE & OBJECTIVE
Interval History: Pt report right thigh site about the same but rash better around abdomen.    Review of Systems   Constitutional: Negative for chills and fever.   HENT: Negative for sore throat.    Respiratory: Negative for cough and shortness of breath.    Cardiovascular: Negative for chest pain.     Objective:     Vital Signs (Most Recent):  Temp: 97.6 °F (36.4 °C) (05/28/17 1339)  Pulse: 77 (05/28/17 1339)  Resp: 18 (05/28/17 1339)  BP: 105/66 (05/28/17 1339)  SpO2: 97 % (05/28/17 1339) Vital Signs (24h Range):  Temp:  [97.4 °F (36.3 °C)-98.2 °F (36.8 °C)] 97.6 °F (36.4 °C)  Pulse:  [75-82] 77  Resp:  [18] 18  SpO2:  [96 %-98 %] 97 %  BP: (105-110)/(64-72) 105/66     Weight: 68 kg (150 lb)  Body mass index is 22.81 kg/m².    Intake/Output Summary (Last 24 hours) at 05/28/17 1659  Last data filed at 05/28/17 1341   Gross per 24 hour   Intake              840 ml   Output                0 ml   Net              840 ml      Physical Exam   Constitutional: He is oriented to person, place, and time. He appears well-developed and well-nourished.   HENT:   Head: Normocephalic and atraumatic.   Eyes: EOM are normal. Pupils are equal, round, and reactive to light.   Neck: Normal range of motion. Neck supple.   Cardiovascular: Normal rate and regular rhythm.    Pulmonary/Chest: Effort normal and breath sounds normal.   Abdominal: Soft. Bowel sounds are normal.   Musculoskeletal: Normal range of motion.   Neurological: He is alert and oriented to person, place, and time.   Skin: Skin is warm and dry.   Right anterior medial thigh with I&D site with packing in place, induration but minimal erythema.  Macular rash covering anterior abdomen with decrease.   Psychiatric: He has a normal mood and affect. His behavior is normal.       Significant Labs: All pertinent labs within the past 24 hours have been reviewed.    Significant Imaging: I have reviewed and interpreted all pertinent imaging results/findings within the past 24  hours.

## 2017-05-28 NOTE — PROGRESS NOTES
[facility] Hospital Medicine  Progress Note    Patient Name: Raz Webster  MRN: [id number]  Patient Class: IP- Inpatient   Admission Date: 5/24/2017  Length of Stay: 4 days  Attending Physician: Olivia Bolivar MD  Primary Care Provider: Primary Doctor No        Subjective:     Principal Problem:Abscess of leg, right    HPI:  This 31 y.o. M, who has a past medical history of HIV disease and Syphilis, presents to the ED for evaluation of an abscess to the right medial thigh, just distal to the groin, x4 days. He notes he was seen at the [facility] 3 days ago and had it lanced. He was given two shots of antibiotics and started on Bactrim, Clindamycin and Levofloxacin. Despite compliance with antibiotics,swelling and redness got worse,he notes the site is more  red, painful and hard. He denies fever, nausea, vomiting, dizziness, numbness, weakness and headache. He is compliant with HIV medications as well.does not know his CD 4 count,Patient had another I&D in ER,only blood came out,culture has been done and patient has been started on broad spectrum IV Abx for failed out patient management for right leg abscess with cellulitis.      Hospital Course:  Mr. Webster was admitted for failed out patient management for right thigh abscess with cellulitis. Pt was started on broad spectrum antibiotics and was seen by Surgery. U/S showed no sign of fluid collection and Surgery felt no further intervention was necessary with only recommendation for daily wound care. Pt did have rash develop thought to be related to possible Bactrim. CD4 count is pending and ID following.    Interval History: Pt report right leg better.    Review of Systems   Constitutional: Negative for chills and fever.   HENT: Negative for sore throat.    Respiratory: Negative for cough and shortness of breath.    Cardiovascular: Negative for chest pain.     Objective:     Vital Signs (Most Recent):  Temp: 98.2 °F (36.8 °C) (05/27/17  2019)  Pulse: 82 (05/27/17 2019)  Resp: 18 (05/27/17 2019)  BP: 109/72 (05/27/17 2019)  SpO2: 98 % (05/27/17 2019) Vital Signs (24h Range):  Temp:  [97.7 °F (36.5 °C)-98.4 °F (36.9 °C)] 98.2 °F (36.8 °C)  Pulse:  [70-90] 82  Resp:  [18] 18  SpO2:  [97 %-98 %] 98 %  BP: ()/(58-72) 109/72     Weight: 68 kg (150 lb)  Body mass index is 22.81 kg/m².    Intake/Output Summary (Last 24 hours) at 05/27/17 9789  Last data filed at 05/27/17 1800   Gross per 24 hour   Intake             1210 ml   Output                0 ml   Net             1210 ml      Physical Exam   Constitutional: He is oriented to person, place, and time. He appears well-developed and well-nourished.   HENT:   Head: Normocephalic and atraumatic.   Eyes: EOM are normal. Pupils are equal, round, and reactive to light.   Neck: Normal range of motion. Neck supple.   Cardiovascular: Normal rate and regular rhythm.    Pulmonary/Chest: Effort normal and breath sounds normal.   Abdominal: Soft. Bowel sounds are normal.   Musculoskeletal: Normal range of motion.   Neurological: He is alert and oriented to person, place, and time.   Skin: Skin is warm and dry.   Right anterior medial thigh with I&D site with packing in place, induration but minimal erythema    Psychiatric: He has a normal mood and affect. His behavior is normal.       Significant Labs: All pertinent labs within the past 24 hours have been reviewed.    Significant Imaging: I have reviewed and interpreted all pertinent imaging results/findings within the past 24 hours.    Assessment/Plan:      * Abscess of leg, right    S/P I&D in ER with Surgical evaluation with U/S with no need for repeat intervention. Wound culture with MRSA, on Vancomycin as per ID, improving.        Rash of body    Started yesterday, possible due to Bactrim as per ID, resolving.        HIV disease    Continue with home HIV medication. Followed by ID in . CD4 count pending.          Cellulitis of right lower extremity     As discussed above.            VTE Risk Mitigation         Ordered     Medium Risk of VTE  Once      05/24/17 1220          Olivia Bolivar MD  Department of Hospital Medicine   [facility]

## 2017-05-28 NOTE — NURSING
Wound care done per order.   Patient tolerated wound care well.   No redness to area around wound.   Less packing required.

## 2017-05-28 NOTE — ASSESSMENT & PLAN NOTE
Pt s/p I&D done in the ER and he was evaluated by Surgery with U/S with no need for repeat intervention. Wound culture with MRSA, on Vancomycin as per ID, improving.

## 2017-05-29 VITALS
DIASTOLIC BLOOD PRESSURE: 76 MMHG | RESPIRATION RATE: 18 BRPM | SYSTOLIC BLOOD PRESSURE: 112 MMHG | HEIGHT: 68 IN | OXYGEN SATURATION: 99 % | BODY MASS INDEX: 22.73 KG/M2 | WEIGHT: 150 LBS | HEART RATE: 78 BPM | TEMPERATURE: 99 F

## 2017-05-29 LAB
ANION GAP SERPL CALC-SCNC: 9 MMOL/L
ANION GAP SERPL CALC-SCNC: 9 MMOL/L
BACTERIA BLD CULT: NORMAL
BACTERIA BLD CULT: NORMAL
BASOPHILS # BLD AUTO: 0.02 K/UL
BASOPHILS NFR BLD: 0.3 %
BUN SERPL-MCNC: 14 MG/DL
BUN SERPL-MCNC: 14 MG/DL
CALCIUM SERPL-MCNC: 8.8 MG/DL
CALCIUM SERPL-MCNC: 8.8 MG/DL
CHLORIDE SERPL-SCNC: 105 MMOL/L
CHLORIDE SERPL-SCNC: 105 MMOL/L
CO2 SERPL-SCNC: 26 MMOL/L
CO2 SERPL-SCNC: 26 MMOL/L
CREAT SERPL-MCNC: 1 MG/DL
CREAT SERPL-MCNC: 1 MG/DL
DIFFERENTIAL METHOD: ABNORMAL
EOSINOPHIL # BLD AUTO: 0.3 K/UL
EOSINOPHIL NFR BLD: 4.4 %
ERYTHROCYTE [DISTWIDTH] IN BLOOD BY AUTOMATED COUNT: 12.6 %
EST. GFR  (AFRICAN AMERICAN): >60 ML/MIN/1.73 M^2
EST. GFR  (AFRICAN AMERICAN): >60 ML/MIN/1.73 M^2
EST. GFR  (NON AFRICAN AMERICAN): >60 ML/MIN/1.73 M^2
EST. GFR  (NON AFRICAN AMERICAN): >60 ML/MIN/1.73 M^2
GLUCOSE SERPL-MCNC: 91 MG/DL
GLUCOSE SERPL-MCNC: 91 MG/DL
HCT VFR BLD AUTO: 45.1 %
HGB BLD-MCNC: 15.1 G/DL
LYMPHOCYTES # BLD AUTO: 1.8 K/UL
LYMPHOCYTES NFR BLD: 28.3 %
MCH RBC QN AUTO: 30.1 PG
MCHC RBC AUTO-ENTMCNC: 33.5 %
MCV RBC AUTO: 90 FL
MONOCYTES # BLD AUTO: 0.7 K/UL
MONOCYTES NFR BLD: 10.6 %
NEUTROPHILS # BLD AUTO: 3.6 K/UL
NEUTROPHILS NFR BLD: 56.4 %
PLATELET # BLD AUTO: 238 K/UL
PMV BLD AUTO: 8.7 FL
POTASSIUM SERPL-SCNC: 4 MMOL/L
POTASSIUM SERPL-SCNC: 4 MMOL/L
RBC # BLD AUTO: 5.02 M/UL
SODIUM SERPL-SCNC: 140 MMOL/L
SODIUM SERPL-SCNC: 140 MMOL/L
WBC # BLD AUTO: 6.35 K/UL

## 2017-05-29 PROCEDURE — 85025 COMPLETE CBC W/AUTO DIFF WBC: CPT

## 2017-05-29 PROCEDURE — 80048 BASIC METABOLIC PNL TOTAL CA: CPT

## 2017-05-29 PROCEDURE — 25000003 PHARM REV CODE 250: Performed by: HOSPITALIST

## 2017-05-29 PROCEDURE — 25000003 PHARM REV CODE 250: Performed by: EMERGENCY MEDICINE

## 2017-05-29 PROCEDURE — 63600175 PHARM REV CODE 636 W HCPCS: Performed by: HOSPITALIST

## 2017-05-29 PROCEDURE — 63600175 PHARM REV CODE 636 W HCPCS: Performed by: EMERGENCY MEDICINE

## 2017-05-29 PROCEDURE — 36415 COLL VENOUS BLD VENIPUNCTURE: CPT

## 2017-05-29 RX ORDER — LINEZOLID 600 MG/1
600 TABLET, FILM COATED ORAL EVERY 12 HOURS
Qty: 28 TABLET | Refills: 0 | Status: SHIPPED | OUTPATIENT
Start: 2017-05-29 | End: 2017-06-12

## 2017-05-29 RX ADMIN — MORPHINE SULFATE 4 MG: 10 INJECTION INTRAVENOUS at 02:05

## 2017-05-29 RX ADMIN — Medication 3 ML: at 02:05

## 2017-05-29 RX ADMIN — VANCOMYCIN HYDROCHLORIDE 1000 MG: 1 INJECTION, POWDER, LYOPHILIZED, FOR SOLUTION INTRAVENOUS at 06:05

## 2017-05-29 RX ADMIN — MORPHINE SULFATE 4 MG: 10 INJECTION INTRAVENOUS at 06:05

## 2017-05-29 RX ADMIN — ONDANSETRON 4 MG: 2 INJECTION INTRAMUSCULAR; INTRAVENOUS at 02:05

## 2017-05-29 NOTE — NURSING
Dr. Bolivar contacted to see if Vanc Trough will be needed since one was done yesterday.   Next dose will be 4th dose due to patient getting vanc every 8 hours.  Dr. Bolivar stated no vanco trough will be ordered at present time.

## 2017-05-29 NOTE — PROGRESS NOTES
Follow-up Information     IVAN Ortiz MD On 6/7/2017.    Specialty:  Internal Medicine  Why:  Outpatient Services, PCP follow-up appointment. Patient should arrive by 2:00PM.   Contact information:  [address] [facility] [place] [address]  [phone]               PLEASE BRING TO ALL FOLLOW UP APPOINTMENTS:   A COPY YOUR DISCHARGE INSTRUCTIONS, Any new MEDICINES YOU ARE CURRENTLY TAKING IN THEIR ORIGINAL BOTTLES  And IDENTIFICATION AND INSURANCE CARD     **PLEASE ARRIVE 15 MINUTES AHEAD OF SCHEDULED APPOINTMENT TIME   ++PLEASE CALL 24 HOURS IN ADVANCE IF YOU MUST RESCHEDULE YOUR APPOINTMENT DAY AND/OR TIME     Help at Home [phone]  After discharge for assistance [facility] On Call Nurse Care Line 24/7 Assistance     Things You are responsible For To Manage Your Care At Home:  1.    Getting your prescriptions filled   2.    Taking your medications as directed, DO NOT MISS ANY DOSES!  3.    Going to your follow-up doctor appointment. This is important because it  allow the doctor to monitor your progress and determine if  any changes need to made to your treatment plan.     Thank you for choosing [facility] for your care.  Please answer any calls you may receive from [facility] we want to continue to support you as you manage your healthcare needs. [facility] is happy to have the opportunity to serve you.      Kathy Chao BS, MSW, CSW  [phone]  Care Management

## 2017-05-29 NOTE — NURSING
Wound care done per order, educated pt on his wound care and supplies needed.  Pt receptive, but reinforcement needed.  Some redness noted around the wound, pt states that the tape is agitating his skin.

## 2017-05-29 NOTE — PLAN OF CARE
Problem: Patient Care Overview  Goal: Plan of Care Review  Outcome: Ongoing (interventions implemented as appropriate)   05/29/17 1522   Coping/Psychosocial   Plan Of Care Reviewed With patient;family   Discharge instructions reviewed with patient and his brother. Denies pain at this time. Only takes meds for pain prior to dressing change. Dressing change with patient assisted and states understand aseptic technique when changing dressing.Abx by mouth ordered and patient states will pick if up. Took antivirals as ordered.  Brother is support person regarding HIV status. Reviewed signs and symptoms of infection. Wound care also reviewed

## 2017-05-29 NOTE — PLAN OF CARE
"   05/29/17 1425   Final Note   Assessment Type Final Discharge Note   Discharge Disposition Home   Discharge planning education complete? Yes   What phone number can be called within the next 1-3 days to see how you are doing after discharge? [phone]   Hospital Follow Up  Appt(s) scheduled? Yes   Discharge plans and expectations educations in teach back method with documentation complete? No   Offered [facility] -- Bedside Delivery? n/a   Discharge/Hospital Encounter Summary to (non-[facility]) PCP n/a   Referral to Outpatient Case Management complete? n/a   Referral to / orders for Home Health Complete? n/a   30 day supply of medicines given at discharge, if documented non-compliance / non-adherence? n/a   Any social issues identified prior to discharge? No   Did you assess the readiness or willingness of the family or caregiver to support self management of care? Yes   Right Care Referral Info   Post Acute Recommendation Home-care     Patient / Family provided with educational information on [employer].  Information reviewed and placed in :My Healthcare Packet" to be brought home for patient / family  to use as resource after discharge.  Information included:  signs and symptoms to look for and call the doctor if experiencing, and symptoms that may indicate a medical emergency: CALL 911.    Reminded patient things they will be responsible for to manage healthcare at home: getting Rx filled, attending follow up appointments, and taking medication as prescribed were discussed.   Teach back method used.  All questions answered.  Patient verbalized understanding of all information.  HENRIQUE provided pt with a copy of follow-up appointments. HENRIQUE explained/highlighted date, time, and location of each appointment. HENRIQUE provided pt with a blue folder and instructed pt to place all medical documents in blue folder. HENRIQUE explained to pt the nurse will provide an AVS with diagnosis and instructed pt to place in blue folder and " bring to follow-up appointment. SW informed pt nurse Guillermina all CM needs have been met.

## 2017-05-29 NOTE — PROGRESS NOTES
.  Progress Note  Infectious Disease    Admit Date: 5/24/2017   LOS: 5 days     SUBJECTIVE:     Follow-up For:  Right thigh abscess.    Antibiotics     Start     Stop Route Frequency Ordered    05/26/17 1130  vancomycin 1 g in dextrose 5 % 250 mL IVPB (ready to mix system)      -- IV Every 8 hours (non-standard times) 05/26/17 1036              OBJECTIVE:     Vital Signs (Most Recent)  Temp: 97.8 °F (36.6 °C) (05/29/17 0801)  Pulse: 71 (05/29/17 0801)  Resp: 18 (05/29/17 0801)  BP: 108/68 (05/29/17 0801)  SpO2: 97 % (05/29/17 0801)    Temperature Range Min/Max (Last 24H):  Temp:  [97.4 °F (36.3 °C)-98.4 °F (36.9 °C)]     I & O (Last 24H):  Intake/Output Summary (Last 24 hours) at 05/29/17 1117  Last data filed at 05/29/17 0900   Gross per 24 hour   Intake             1080 ml   Output                0 ml   Net             1080 ml           Lines/Drains:       Peripheral IV - Single Lumen 05/27/17 1807 Right Antecubital (Active)   Site Assessment Clean;Dry;Intact 5/28/2017  8:00 PM   Line Status Saline locked 5/28/2017  8:00 PM   Dressing Status Clean;Dry;Intact 5/28/2017  8:00 PM       Laboratory:  Recent Results (from the past 24 hour(s))   Basic Metabolic Panel (BMP)    Collection Time: 05/29/17  4:21 AM   Result Value Ref Range    Sodium 140 136 - 145 mmol/L    Potassium 4.0 3.5 - 5.1 mmol/L    Chloride 105 95 - 110 mmol/L    CO2 26 23 - 29 mmol/L    Glucose 91 70 - 110 mg/dL    BUN, Bld 14 6 - 20 mg/dL    Creatinine 1.0 0.5 - 1.4 mg/dL    Calcium 8.8 8.7 - 10.5 mg/dL    Anion Gap 9 8 - 16 mmol/L    eGFR if African American >60 >60 mL/min/1.73 m^2    eGFR if non African American >60 >60 mL/min/1.73 m^2   CBC with Automated Differential    Collection Time: 05/29/17  4:21 AM   Result Value Ref Range    WBC 6.35 3.90 - 12.70 K/uL    RBC 5.02 4.60 - 6.20 M/uL    Hemoglobin 15.1 14.0 - 18.0 g/dL    Hematocrit 45.1 40.0 - 54.0 %    MCV 90 82 - 98 fL    MCH 30.1 27.0 - 31.0 pg    MCHC 33.5 32.0 - 36.0 %    RDW 12.6 11.5  - 14.5 %    Platelets 238 150 - 350 K/uL    MPV 8.7 (L) 9.2 - 12.9 fL    Gran # 3.6 1.8 - 7.7 K/uL    Lymph # 1.8 1.0 - 4.8 K/uL    Mono # 0.7 0.3 - 1.0 K/uL    Eos # 0.3 0.0 - 0.5 K/uL    Baso # 0.02 0.00 - 0.20 K/uL    Gran% 56.4 38.0 - 73.0 %    Lymph% 28.3 18.0 - 48.0 %    Mono% 10.6 4.0 - 15.0 %    Eosinophil% 4.4 0.0 - 8.0 %    Basophil% 0.3 0.0 - 1.9 %    Differential Method Automated    Basic metabolic panel    Collection Time: 05/29/17  4:21 AM   Result Value Ref Range    Sodium 140 136 - 145 mmol/L    Potassium 4.0 3.5 - 5.1 mmol/L    Chloride 105 95 - 110 mmol/L    CO2 26 23 - 29 mmol/L    Glucose 91 70 - 110 mg/dL    BUN, Bld 14 6 - 20 mg/dL    Creatinine 1.0 0.5 - 1.4 mg/dL    Calcium 8.8 8.7 - 10.5 mg/dL    Anion Gap 9 8 - 16 mmol/L    eGFR if African American >60 >60 mL/min/1.73 m^2    eGFR if non African American >60 >60 mL/min/1.73 m^2       Micro:  Microbiology Results (last 7 days)     Procedure Component Value Units Date/Time    Blood culture [[id number]] Collected:  05/24/17 1128    Order Status:  Completed Specimen:  Blood from Peripheral, Hand, Right Updated:  05/28/17 1503     Blood Culture, Routine No Growth to date     Blood Culture, Routine No Growth to date     Blood Culture, Routine No Growth to date     Blood Culture, Routine No Growth to date     Blood Culture, Routine No Growth to date    Blood culture [[id number]] Collected:  05/24/17 1144    Order Status:  Completed Specimen:  Blood from Peripheral, Hand, Left Updated:  05/28/17 1303     Blood Culture, Routine No Growth to date     Blood Culture, Routine No Growth to date     Blood Culture, Routine No Growth to date     Blood Culture, Routine No Growth to date     Blood Culture, Routine No Growth to date    Aerobic culture #1 [[id number]]  (Susceptibility) Collected:  05/24/17 1200    Order Status:  Completed Specimen:  Abscess from Abscess Updated:  05/27/17 0845     Aerobic Bacterial Culture called to [facility]  05/27/2017  08:45     Aerobic Bacterial Culture --     METHICILLIN RESISTANT STAPHYLOCOCCUS AUREUS  Few      Narrative:       Culture #1              ASSESSMENT/PLAN:     Active Hospital Problems    Diagnosis  POA    *Abscess of leg, right [L02.415]  Yes    Rash of body [R21]  Yes    Cellulitis of right lower extremity [L03.115]  Yes    HIV disease [B20]  Yes      Resolved Hospital Problems    Diagnosis Date Resolved POA   No resolved problems to display.       Physical Exam:  Gen: NAD  Pul: CTA   Cardio:  +S1+S2  Abd: Soft, NT  Ext: No edema  Skin: dressing not removed... Was changed earlier this am and he said that it appeared and looked good.    IMPRESSION;  1) HIV (CD4 unknown).  2) Right thigh abscess.  3) Diffuse rash.     RECOMMENDATIONS;  1) Cont with the Descovy and Prezcobix.  2) D/W case management re: linezolid.   3) If there are any issues, then can use doxy/mahendra in lieu of it.   4) Contact me if there are any complications; [phone].  5) He is going home today.... We will sign off.

## 2017-05-29 NOTE — PROGRESS NOTES
.  Progress Note  Infectious Disease    Admit Date: 5/24/2017   LOS: 4 days     SUBJECTIVE:     Follow-up For:  Right thigh abscess.    Antibiotics     Start     Stop Route Frequency Ordered    05/26/17 1130  vancomycin 1 g in dextrose 5 % 250 mL IVPB (ready to mix system)      -- IV Every 8 hours (non-standard times) 05/26/17 1036              OBJECTIVE:     Vital Signs (Most Recent)  Temp: 97.4 °F (36.3 °C) (05/28/17 1833)  Pulse: 91 (05/28/17 1833)  Resp: 18 (05/28/17 1833)  BP: 109/67 (05/28/17 1833)  SpO2: 97 % (05/28/17 1833)    Temperature Range Min/Max (Last 24H):  Temp:  [97.4 °F (36.3 °C)-98.2 °F (36.8 °C)]     I & O (Last 24H):  Intake/Output Summary (Last 24 hours) at 05/28/17 1909  Last data filed at 05/28/17 1835   Gross per 24 hour   Intake              960 ml   Output                0 ml   Net              960 ml         Lines/Drains:       Peripheral IV - Single Lumen 05/27/17 1807 Right Antecubital (Active)   Site Assessment Clean;Dry;Intact;No redness;No swelling 5/28/2017  8:00 AM   Line Status Saline locked 5/28/2017  8:00 AM   Dressing Status Clean;Dry;Intact 5/28/2017  8:00 AM       Laboratory:  Recent Results (from the past 24 hour(s))   Basic Metabolic Panel (BMP)    Collection Time: 05/28/17  5:13 AM   Result Value Ref Range    Sodium 140 136 - 145 mmol/L    Potassium 4.1 3.5 - 5.1 mmol/L    Chloride 103 95 - 110 mmol/L    CO2 28 23 - 29 mmol/L    Glucose 92 70 - 110 mg/dL    BUN, Bld 12 6 - 20 mg/dL    Creatinine 0.9 0.5 - 1.4 mg/dL    Calcium 8.9 8.7 - 10.5 mg/dL    Anion Gap 9 8 - 16 mmol/L    eGFR if African American >60 >60 mL/min/1.73 m^2    eGFR if non African American >60 >60 mL/min/1.73 m^2   CBC with Automated Differential    Collection Time: 05/28/17  5:13 AM   Result Value Ref Range    WBC 7.35 3.90 - 12.70 K/uL    RBC 5.08 4.60 - 6.20 M/uL    Hemoglobin 15.3 14.0 - 18.0 g/dL    Hematocrit 45.7 40.0 - 54.0 %    MCV 90 82 - 98 fL    MCH 30.1 27.0 - 31.0 pg    MCHC 33.5 32.0 -  36.0 %    RDW 12.7 11.5 - 14.5 %    Platelets 245 150 - 350 K/uL    MPV 8.7 (L) 9.2 - 12.9 fL    Gran # 4.8 1.8 - 7.7 K/uL    Lymph # 1.6 1.0 - 4.8 K/uL    Mono # 0.6 0.3 - 1.0 K/uL    Eos # 0.2 0.0 - 0.5 K/uL    Baso # 0.02 0.00 - 0.20 K/uL    Gran% 65.7 38.0 - 73.0 %    Lymph% 22.0 18.0 - 48.0 %    Mono% 8.7 4.0 - 15.0 %    Eosinophil% 3.3 0.0 - 8.0 %    Basophil% 0.3 0.0 - 1.9 %    Differential Method Automated    Basic metabolic panel    Collection Time: 05/28/17  5:13 AM   Result Value Ref Range    Sodium 140 136 - 145 mmol/L    Potassium 4.1 3.5 - 5.1 mmol/L    Chloride 103 95 - 110 mmol/L    CO2 28 23 - 29 mmol/L    Glucose 92 70 - 110 mg/dL    BUN, Bld 12 6 - 20 mg/dL    Creatinine 0.9 0.5 - 1.4 mg/dL    Calcium 8.9 8.7 - 10.5 mg/dL    Anion Gap 9 8 - 16 mmol/L    eGFR if African American >60 >60 mL/min/1.73 m^2    eGFR if non African American >60 >60 mL/min/1.73 m^2       Micro:  Microbiology Results (last 7 days)     Procedure Component Value Units Date/Time    Blood culture [[id number]] Collected:  05/24/17 1128    Order Status:  Completed Specimen:  Blood from Peripheral, Hand, Right Updated:  05/28/17 1503     Blood Culture, Routine No Growth to date     Blood Culture, Routine No Growth to date     Blood Culture, Routine No Growth to date     Blood Culture, Routine No Growth to date     Blood Culture, Routine No Growth to date    Blood culture [[id number]] Collected:  05/24/17 1144    Order Status:  Completed Specimen:  Blood from Peripheral, Hand, Left Updated:  05/28/17 1303     Blood Culture, Routine No Growth to date     Blood Culture, Routine No Growth to date     Blood Culture, Routine No Growth to date     Blood Culture, Routine No Growth to date     Blood Culture, Routine No Growth to date    Aerobic culture #1 [[id number]]  (Susceptibility) Collected:  05/24/17 1200    Order Status:  Completed Specimen:  Abscess from Abscess Updated:  05/27/17 0845     Aerobic Bacterial Culture called to Miriam  [facility] [place] 05/27/2017  08:45     Aerobic Bacterial Culture --     METHICILLIN RESISTANT STAPHYLOCOCCUS AUREUS  Few      Narrative:       Culture #1              ASSESSMENT/PLAN:     Active Hospital Problems    Diagnosis  POA    *Abscess of leg, right [L02.415]  Yes    Rash of body [R21]  Yes    Cellulitis of right lower extremity [L03.115]  Yes    HIV disease [B20]  Yes      Resolved Hospital Problems    Diagnosis Date Resolved POA   No resolved problems to display.       Physical Exam:  Gen: NAD  Pul: CTA   Cardio:  +S1+S2  Abd: Soft, NT  Ext: No edema  Skin: dressing not removed... Rash a bit improved    IMPRESSION;  1) HIV (CD4 unknown).  2) Right thigh abscess.  3) Diffuse rash.     RECOMMENDATIONS;  1) Cont with the Descovy and Prezcobix.  2) Cont with the vanc; trough looking good.  3) RPR is negative; rash likely TMP-SMX allergy.  4) He will stay another night tonight to get some more anbx, intravenously.  5) Homegoing; while doxy/mahendra are options, I think that linezolid 600mg po q12 for 14 days is better for him... Can case management check into this tomorrow?   6) We will cont to follow.

## 2017-05-29 NOTE — PROGRESS NOTES
HENRIQUE faxed pt script for Zyvox to [facility] @ [phone]. SW awaiting cost of medication.     10:40PM- HENRIQUE contacted [facility] @ [phone] to check the cost of pt prescription. [facility] closed and HENRIQUE faxed script to [facility] @ [phone] after speaking with pt.     1:00PM- HENRIQUE faxed script to [facility] @  [phone] to receive a cost on medication.     1:40PM- HENRIQUE contacted [facility] @ [phone] and was informed pt script cost $ 15.00 but they do not have the medication in stock. Pharmacist asked if she could send script to [facility]. HENRIQUE informed yes, and informed pt his script can be picked up from [facility] in [place].

## 2017-05-30 ENCOUNTER — NURSE TRIAGE (OUTPATIENT)
Dept: ADMINISTRATIVE | Facility: CLINIC | Age: 32
End: 2017-05-30

## 2017-05-30 NOTE — DISCHARGE SUMMARY
"[facility] Hospital Medicine  Discharge Summary      Patient Name: Raz Webster  MRN: [id number]  Admission Date: 5/24/2017  Hospital Length of Stay: 5 days  Discharge Date and Time:  05/29/2017 6:51 PM  Attending Physician: No att. providers found   Discharging Provider: Olga Lala MD  Primary Care Provider: Primary Doctor Theresa      HPI:   This 31 y.o. M, who has a past medical history of HIV disease and Syphilis, presents to the ED for evaluation of an abscess to the right medial thigh, just distal to the groin, x4 days. He notes he was seen at the [facility] 3 days ago and had it lanced. He was given two shots of antibiotics and started on Bactrim, Clindamycin and Levofloxacin. Despite compliance with antibiotics,swelling and redness got worse,he notes the site is more  red, painful and hard. He denies fever, nausea, vomiting, dizziness, numbness, weakness and headache. He is compliant with HIV medications as well.does not know his CD 4 count,Patient had another I&D in ER,only blood came out,culture has been done and patient has been started on broad spectrum IV Abx for failed out patient management for right leg abscess with cellulitis.      * No surgery found *      Indwelling Lines/Drains at time of discharge:   Lines/Drains/Airways          No matching active lines, drains, or airways        Hospital Course:   Mr. Webster was admitted for failed outpatient management for right thigh abscess with cellulitis. Pt was started on broad spectrum antibiotics and was seen by Surgery. U/S showed no sign of fluid collection and Surgery felt no further intervention was necessary with only recommendation for daily wound care. Pt did have a rash develop that was thought to be related to Bactrim vs a dose of penicillin G given in case this was due to Syphilis. RPR was negative. CD4 count was not obtained given acute illness but patient did report it was going "the wrong way". Continued on HARRT. " Blood cultures negative. Culture of purulent material grew MRSA. Treated with vancomycin and wound care while inpatient. Patient was taught by nursing on how to do dressing changes so can do it himself at home. Materials provided for home use. Abx changed to linezolid to complete a total of 14 days of antibiotic therapy, per ID recs. Is to follow up with HIV specialist and is to establish care with PCP. Regular diet. Activity as tolerated.      Consults:   Consults         Status Ordering Provider     Infectious Disease  Once     Provider:  Holly Packer MD    Completed TAYLOR FLORES S     Inpatient consult to General Surgery  Once     Provider:  Ronal Lucio MD    Completed DANITA ALEJO              Pending Diagnostic Studies:     None        Final Active Diagnoses:    Diagnosis Date Noted POA    PRINCIPAL PROBLEM:  Abscess of leg, right [L02.415] 05/24/2017 Yes    Rash of body [R21] 05/25/2017 Yes    Cellulitis of right lower extremity [L03.115] 05/24/2017 Yes    HIV disease [B20] 05/24/2017 Yes      Problems Resolved During this Admission:    Diagnosis Date Noted Date Resolved POA        Discharged Condition: good    Disposition: Home or Self Care    Follow Up:  Follow-up Information     P Brian Ortiz MD On 6/7/2017.    Specialty:  Internal Medicine  Why:  Outpatient Services, PCP follow-up appointment. Patient should arrive by 2:00PM.   Contact information:  [address]  [place] [address]  [phone]                 Medications:  Reconciled Home Medications:   Discharge Medication List as of 5/29/2017  2:39 PM      START taking these medications    Details   linezolid (ZYVOX) 600 mg Tab Take 1 tablet (600 mg total) by mouth every 12 (twelve) hours., Starting Mon 5/29/2017, Until Mon 6/12/2017, Print         CONTINUE these medications which have NOT CHANGED    Details   DARUNAVIR/COBICISTAT (PREZCOBIX ORAL) Take by mouth once daily. , Historical Med       desoximetasone (TOPICORT) 0.25 % Spry Apply topically 2 (two) times daily., Until Discontinued, Historical Med      EMTRICITABINE/TENOFOV ALAFENAM (DESCOVY ORAL) Take by mouth once daily. , Historical Med      sulfamethoxazole-trimethoprim 800-160mg (BACTRIM DS) 800-160 mg Tab Take 1 tablet by mouth every 12 (twelve) hours., Historical Med         STOP taking these medications       clindamycin (CLEOCIN) 300 MG capsule Comments:   Reason for Stopping:         levoFLOXacin (LEVAQUIN) 750 MG tablet Comments:   Reason for Stopping:             Time spent on the discharge of patient: >35 minutes    Olga Lala MD  Department of Hospital Medicine  [facility]

## 2017-05-31 NOTE — TELEPHONE ENCOUNTER
Reason for Disposition   Wound doesn't sound infected    Answer Assessment - Initial Assessment Questions  Pt discharged from hospital yesterday after 5 day stay for treatment of abcess on his right thigh. Was concerned tonight when he changed dressing as the redness around the area looked a little more than this am but still not passed the ring the dr made around the site. On further checking reported the redness now doesn't look quite as much as when he initially checked it. He was concerned perhaps the irritation from the tape and rubbing while at work today caused the increased redness. Denied fever -no other sx's reported.    Protocols used: ST WOUND INFECTION-A-    Advised on how to apply dressing using less tape on skin.

## 2017-06-15 ENCOUNTER — OFFICE VISIT (OUTPATIENT)
Dept: INTERNAL MEDICINE | Facility: CLINIC | Age: 32
End: 2017-06-15
Payer: COMMERCIAL

## 2017-06-15 VITALS
BODY MASS INDEX: 22.55 KG/M2 | HEIGHT: 68 IN | RESPIRATION RATE: 16 BRPM | SYSTOLIC BLOOD PRESSURE: 117 MMHG | TEMPERATURE: 98 F | HEART RATE: 77 BPM | DIASTOLIC BLOOD PRESSURE: 78 MMHG | WEIGHT: 148.81 LBS

## 2017-06-15 DIAGNOSIS — L02.415 ABSCESS OF RIGHT THIGH: Primary | ICD-10-CM

## 2017-06-15 PROCEDURE — 99999 PR PBB SHADOW E&M-EST. PATIENT-LVL III: CPT | Mod: PBBFAC,,, | Performed by: INTERNAL MEDICINE

## 2017-06-15 PROCEDURE — 99213 OFFICE O/P EST LOW 20 MIN: CPT | Mod: S$GLB,,, | Performed by: INTERNAL MEDICINE

## 2017-06-15 RX ORDER — IBUPROFEN 600 MG/1
TABLET ORAL
COMMUNITY
Start: 2017-05-15

## 2017-06-15 RX ORDER — EMTRICITABINE AND TENOFOVIR ALAFENAMIDE 200; 25 MG/1; MG/1
TABLET ORAL
COMMUNITY
Start: 2017-06-02

## 2017-06-15 NOTE — PROGRESS NOTES
Transitional Care Note  Subjective:       Patient ID: Raz Webster is a 31 y.o. male.  Chief Complaint: Follow-up (hosptial )    Family and/or Caretaker present at visit?  No.  Diagnostic tests reviewed/disposition: No diagnosic tests pending after this hospitalization.  Disease/illness education: provided   Home health/community services discussion/referrals: Patient does not have home health established from hospital visit.  They do not need home health.  If needed, we will set up home health for the patient.   Establishment or re-establishment of referral orders for community resources: No other necessary community resources.   Discussion with other health care providers: No discussion with other health care providers necessary.   HPI     HPI:   This 31 y.o. M, who has a past medical history of HIV disease and Syphilis, presents to the ED for evaluation of an abscess to the right medial thigh, just distal to the groin, x4 days. He notes he was seen at the [facility] 3 days ago and had it lanced. He was given two shots of antibiotics and started on Bactrim, Clindamycin and Levofloxacin. Despite compliance with antibiotics,swelling and redness got worse,he notes the site is more  red, painful and hard. He denies fever, nausea, vomiting, dizziness, numbness, weakness and headache. He is compliant with HIV medications as well.does not know his CD 4 count,Patient had another I&D in ER,only blood came out,culture has been done and patient has been started on broad spectrum IV Abx for failed out patient management for right leg abscess with cellulitis.    Hospital Course:   Mr. Webster was admitted for failed outpatient management for right thigh abscess with cellulitis. Pt was started on broad spectrum antibiotics and was seen by Surgery. U/S showed no sign of fluid collection and Surgery felt no further intervention was necessary with only recommendation for daily wound care. Pt did have a rash develop that was  "thought to be related to Bactrim vs a dose of penicillin G given in case this was due to Syphilis. RPR was negative. CD4 count was not obtained given acute illness but patient did report it was going "the wrong way". Continued on HARRT. Blood cultures negative. Culture of purulent material grew MRSA. Treated with vancomycin and wound care while inpatient. Patient was taught by nursing on how to do dressing changes so can do it himself at home. Materials provided for home use. Abx changed to linezolid to complete a total of 14 days of antibiotic therapy, per ID recs. Is to follow up with HIV specialist and is to establish care with PCP. Regular diet. Activity as tolerated.       Currently:  Patient is feeling well. No further pain, erythema, induration located to the right thigh. Minimal scarring now present. No drainage. No fever/chills. Feeling good.    Review of Systems   Constitutional: Negative for chills, fatigue and fever.   HENT: Negative for congestion, ear pain, postnasal drip, rhinorrhea, sinus pressure and sore throat.    Eyes: Negative for itching and visual disturbance.   Respiratory: Negative for cough, shortness of breath and wheezing.    Cardiovascular: Negative for chest pain, palpitations and leg swelling.   Gastrointestinal: Negative for abdominal pain and nausea.   Genitourinary: Negative for dysuria.   Musculoskeletal: Negative for arthralgias and myalgias.   Skin: Negative for rash.   Neurological: Negative for weakness, light-headedness and headaches.       Objective:      Physical Exam   Constitutional: He is oriented to person, place, and time. He appears well-developed and well-nourished. No distress.   HENT:   Head: Normocephalic and atraumatic.   Neurological: He is alert and oriented to person, place, and time.   Right lower extremity:  No swelling, erythema, no TTP to the area of concern  Minimal scarring present  No drainage  No fluctuance palpable     Skin: Skin is warm and dry. He is " not diaphoretic.   Psychiatric: He has a normal mood and affect. His behavior is normal.   Nursing note and vitals reviewed.      Assessment:       1. Abscess of right thigh        Plan:       Healing appropriately  No additional antibiotics needed  Needs to establish care with new PCP as I will be leaving clinic , names provided

## 2017-08-09 ENCOUNTER — OFFICE VISIT (OUTPATIENT)
Dept: INTERNAL MEDICINE | Facility: CLINIC | Age: 32
End: 2017-08-09
Payer: COMMERCIAL

## 2017-08-09 VITALS
SYSTOLIC BLOOD PRESSURE: 129 MMHG | DIASTOLIC BLOOD PRESSURE: 87 MMHG | TEMPERATURE: 99 F | RESPIRATION RATE: 16 BRPM | BODY MASS INDEX: 22.88 KG/M2 | HEART RATE: 102 BPM | WEIGHT: 151 LBS | HEIGHT: 68 IN

## 2017-08-09 DIAGNOSIS — B20 HIV DISEASE: ICD-10-CM

## 2017-08-09 DIAGNOSIS — R21 RASH: Primary | ICD-10-CM

## 2017-08-09 PROCEDURE — 99999 PR PBB SHADOW E&M-EST. PATIENT-LVL III: CPT | Mod: PBBFAC,,, | Performed by: FAMILY MEDICINE

## 2017-08-09 PROCEDURE — 99214 OFFICE O/P EST MOD 30 MIN: CPT | Mod: S$GLB,,, | Performed by: FAMILY MEDICINE

## 2017-08-09 PROCEDURE — 3008F BODY MASS INDEX DOCD: CPT | Mod: S$GLB,,, | Performed by: FAMILY MEDICINE

## 2017-08-09 RX ORDER — DARUNAVIR ETHANOLATE AND COBICISTAT 800; 150 MG/1; MG/1
TABLET, FILM COATED ORAL
Refills: 2 | COMMUNITY
Start: 2017-07-26

## 2017-08-10 NOTE — PROGRESS NOTES
"Subjective:   Patient ID: Raz Webster is a 31 y.o. male.    Chief Complaint: Rash (all over body)      HPI  30 yo male with hiv disease compliant with meds and with reported "good" last CD4 count here with fine rash that is present mostly at night on trunk and somewhat on upper extremities. Not denver pruritic. No upper resp symptoms. No cough or wheeze. No change in meds recently. Alcon Vera MD of [facility] is his ID MD.  Taking benadryl with relief at night but it has returned over past days.    Patient queried and denies any further complaints.      ALLERGIES AND MEDICATIONS: updated and reviewed.  Review of patient's allergies indicates:  No Known Allergies    Current Outpatient Prescriptions:     DARUNAVIR/COBICISTAT (PREZCOBIX ORAL), Take by mouth once daily. , Disp: , Rfl:     DESCOVY 200-25 mg Tab, , Disp: , Rfl:     desoximetasone (TOPICORT) 0.25 % Spry, Apply topically 2 (two) times daily., Disp: , Rfl:     EMTRICITABINE/TENOFOV ALAFENAM (DESCOVY ORAL), Take by mouth once daily. , Disp: , Rfl:     ibuprofen (ADVIL,MOTRIN) 600 MG tablet, , Disp: , Rfl:     PREZCOBIX 800-150 mg-mg Tab, TK 1 T PO QD, Disp: , Rfl: 2    Review of Systems   Constitutional: Negative for activity change, appetite change, chills, diaphoresis, fatigue, fever and unexpected weight change.   HENT: Negative for congestion, ear discharge, ear pain, postnasal drip, rhinorrhea, sneezing and sore throat.    Eyes: Negative for photophobia and discharge.   Respiratory: Negative for cough, chest tightness, shortness of breath and wheezing.    Cardiovascular: Negative for chest pain and palpitations.   Gastrointestinal: Negative for abdominal distention, abdominal pain, diarrhea, nausea and vomiting.   Genitourinary: Negative for dysuria.   Musculoskeletal: Negative for arthralgias and neck pain.   Skin: Negative for rash.   Neurological: Negative for headaches.       Objective:     Vitals:    08/09/17 1501   BP: 129/87   Pulse: 102   Resp: 16 " "  Temp: 98.7 °F (37.1 °C)   TempSrc: Oral   Weight: 68.5 kg (151 lb 0.2 oz)   Height: 5' 8" (1.727 m)   PainSc: 0-No pain     Body mass index is 22.96 kg/m².    Physical Exam   Constitutional: He appears well-developed and well-nourished.   HENT:   Head: Normocephalic and atraumatic.   Right Ear: Hearing, tympanic membrane, external ear and ear canal normal. No drainage or swelling. No decreased hearing is noted.   Left Ear: Hearing, tympanic membrane, external ear and ear canal normal. No drainage or swelling. No decreased hearing is noted.   Nose: Nose normal. No rhinorrhea.   Mouth/Throat: Oropharynx is clear and moist. No oropharyngeal exudate, posterior oropharyngeal edema or posterior oropharyngeal erythema.   Eyes: Conjunctivae, EOM and lids are normal. Pupils are equal, round, and reactive to light. Right eye exhibits no discharge and no exudate. Left eye exhibits no discharge and no exudate. Right conjunctiva is not injected. Left conjunctiva is not injected.   Neck: Trachea normal and full passive range of motion without pain. Normal carotid pulses, no hepatojugular reflux and no JVD present. Carotid bruit is not present. No neck rigidity. No edema and no erythema present. No thyroid mass and no thyromegaly present.   Cardiovascular: Normal rate, regular rhythm and normal heart sounds.    Pulmonary/Chest: Effort normal. No respiratory distress.   Abdominal: Soft. Normal appearance and bowel sounds are normal. There is no tenderness. There is negative Rivas's sign.   Lymphadenopathy:     He has no cervical adenopathy.   Neurological: He is alert.   Skin: Skin is warm and dry. Rash noted. Rash is maculopapular.        Psychiatric: He has a normal mood and affect. His speech is normal and behavior is normal.       Assessment and Plan:   Raz was seen today for rash.    Diagnoses and all orders for this visit:    Rash  First, just try OTC benadryl at night as he is doing PLUS  Zyrtec 10mg po bid x 3 days " then once daily PLUS  Zantac 150mg po bid x 14 days AND  Call Dr. Vera.     Handwrote Rx for Medrol Dose Pack in case it worsens but instructed pt to wait 24 hrs to see if it is WORSE before getting med. Patient expressed understanding of the plan as evidenced by brief summary back to me.     HIV disease    Time spent in the evaluation and management of this patient exceeded 45min and greater than 50% of this time was in face-to-face education regarding diagnoses, medications, plan, and follow-up.      No Follow-up on file.    THIS NOTE WILL BE SHARED WITH THE PATIENT.

## 2020-03-23 ENCOUNTER — NURSE TRIAGE (OUTPATIENT)
Dept: ADMINISTRATIVE | Facility: CLINIC | Age: 35
End: 2020-03-23

## 2020-03-23 NOTE — TELEPHONE ENCOUNTER
Went to urgent Care this morning and was told he needed to get tested. Was given a paper stating he needed to be tested. Sent to Urgent Care in [place] for testing.     Reason for Disposition   [1] Difficulty breathing (shortness of breath) occurs AND [2] onset > 14 days after COVID-19 EXPOSURE (Close Contact)   [1] Dry cough occurs AND [2] onset > 14 days after COVID-19 EXPOSURE   [1] Fever or feeling feverish AND [2] within 14 Days of COVID-19 EXPOSURE (Close Contact)    Additional Information   Negative: Severe difficulty breathing (e.g., struggling for each breath, speak in single words, bluish lips)   Negative: Sounds like a life-threatening emergency to the triager   Negative: [1] Wet cough (i.e., white-yellow, yellow, green, or crescencio colored sputum) AND [2] onset > 14 days after COVID-19 EXPOSURE   Negative: [1] Common cold symptoms AND [2] onset > 14 days after COVID-19 EXPOSURE   Negative: [1] Difficulty breathing occurs AND [2] within 14 days of COVID-19 EXPOSURE (Close Contact)   Negative: Patient sounds very sick or weak to the triager   Negative: [1] COVID-19 EXPOSURE (Close Contact) within last 14 days AND [2] NO cough, fever, or breathing difficulty   Negative: [1] Travel from or living in high risk area (identified by CDC) AND [2] within last 14 days AND [3] NO cough or fever or breathing difficulty    Protocols used: CORONAVIRUS (COVID-19) EXPOSURE-A-

## 2021-07-01 ENCOUNTER — PATIENT MESSAGE (OUTPATIENT)
Dept: ADMINISTRATIVE | Facility: OTHER | Age: 36
End: 2021-07-01

## 2025-05-12 DIAGNOSIS — D75.1 POLYCYTHEMIA: Primary | ICD-10-CM

## 2025-05-16 ENCOUNTER — LAB VISIT (OUTPATIENT)
Dept: LAB | Facility: HOSPITAL | Age: 40
End: 2025-05-16
Attending: STUDENT IN AN ORGANIZED HEALTH CARE EDUCATION/TRAINING PROGRAM
Payer: COMMERCIAL

## 2025-05-16 ENCOUNTER — OFFICE VISIT (OUTPATIENT)
Dept: HEMATOLOGY/ONCOLOGY | Facility: CLINIC | Age: 40
End: 2025-05-16
Payer: COMMERCIAL

## 2025-05-16 VITALS
BODY MASS INDEX: 25.51 KG/M2 | DIASTOLIC BLOOD PRESSURE: 78 MMHG | HEIGHT: 67 IN | HEART RATE: 98 BPM | WEIGHT: 162.5 LBS | OXYGEN SATURATION: 96 % | SYSTOLIC BLOOD PRESSURE: 120 MMHG | TEMPERATURE: 99 F

## 2025-05-16 DIAGNOSIS — D75.1 POLYCYTHEMIA: ICD-10-CM

## 2025-05-16 DIAGNOSIS — Z76.89 ENCOUNTER TO ESTABLISH CARE: ICD-10-CM

## 2025-05-16 DIAGNOSIS — D75.1 POLYCYTHEMIA: Primary | ICD-10-CM

## 2025-05-16 LAB
ABSOLUTE EOSINOPHIL (OHS): 0.21 K/UL
ABSOLUTE MONOCYTE (OHS): 0.87 K/UL (ref 0.3–1)
ABSOLUTE NEUTROPHIL COUNT (OHS): 6.99 K/UL (ref 1.8–7.7)
BASOPHILS # BLD AUTO: 0.05 K/UL
BASOPHILS NFR BLD AUTO: 0.5 %
BILIRUB UR QL STRIP.AUTO: NEGATIVE
CLARITY UR: CLEAR
COLOR UR AUTO: YELLOW
ERYTHROCYTE [DISTWIDTH] IN BLOOD BY AUTOMATED COUNT: 12.3 % (ref 11.5–14.5)
GLUCOSE UR QL STRIP: NEGATIVE
HCT VFR BLD AUTO: 50.5 % (ref 40–54)
HGB BLD-MCNC: 17 GM/DL (ref 14–18)
HGB UR QL STRIP: NEGATIVE
IMM GRANULOCYTES # BLD AUTO: 0.04 K/UL (ref 0–0.04)
IMM GRANULOCYTES NFR BLD AUTO: 0.4 % (ref 0–0.5)
KETONES UR QL STRIP: NEGATIVE
LEUKOCYTE ESTERASE UR QL STRIP: NEGATIVE
LYMPHOCYTES # BLD AUTO: 2.1 K/UL (ref 1–4.8)
MCH RBC QN AUTO: 30.1 PG (ref 27–31)
MCHC RBC AUTO-ENTMCNC: 33.7 G/DL (ref 32–36)
MCV RBC AUTO: 90 FL (ref 82–98)
NITRITE UR QL STRIP: NEGATIVE
NUCLEATED RBC (/100WBC) (OHS): 0 /100 WBC
PH UR STRIP: 6 [PH]
PLATELET # BLD AUTO: 229 K/UL (ref 150–450)
PMV BLD AUTO: 8.9 FL (ref 9.2–12.9)
PROT UR QL STRIP: NEGATIVE
RBC # BLD AUTO: 5.64 M/UL (ref 4.6–6.2)
RELATIVE EOSINOPHIL (OHS): 2 %
RELATIVE LYMPHOCYTE (OHS): 20.5 % (ref 18–48)
RELATIVE MONOCYTE (OHS): 8.5 % (ref 4–15)
RELATIVE NEUTROPHIL (OHS): 68.1 % (ref 38–73)
RETICS/RBC NFR AUTO: 1.6 % (ref 0.4–2)
SP GR UR STRIP: 1.02
UROBILINOGEN UR STRIP-ACNC: NEGATIVE EU/DL
WBC # BLD AUTO: 10.26 K/UL (ref 3.9–12.7)

## 2025-05-16 PROCEDURE — 81003 URINALYSIS AUTO W/O SCOPE: CPT

## 2025-05-16 PROCEDURE — 82668 ASSAY OF ERYTHROPOIETIN: CPT

## 2025-05-16 PROCEDURE — 85025 COMPLETE CBC W/AUTO DIFF WBC: CPT

## 2025-05-16 PROCEDURE — 36415 COLL VENOUS BLD VENIPUNCTURE: CPT

## 2025-05-16 PROCEDURE — 99999 PR PBB SHADOW E&M-EST. PATIENT-LVL IV: CPT | Mod: PBBFAC,,, | Performed by: STUDENT IN AN ORGANIZED HEALTH CARE EDUCATION/TRAINING PROGRAM

## 2025-05-16 PROCEDURE — 85045 AUTOMATED RETICULOCYTE COUNT: CPT

## 2025-05-16 NOTE — PROGRESS NOTES
Hematology- Oncology Clinic Note :     5/16/2025    Chief Complaint   Patient presents with    polycythemia    Establish Care         HPI  Pt is a 39 y.o. male who  has a past medical history of HIV disease and Syphilis. Who presents for follow up of polycythemia.  Pt feels well and denied any issues at time of exam.  Pt agreeable to further workup and all questions answered to his satisfaction.        Patient Active Problem List    Diagnosis Date Noted    Rash of body 05/25/2017    Cellulitis of right lower extremity 05/24/2017    Abscess of leg, right 05/24/2017    HIV disease 05/24/2017     Past Medical History:   Diagnosis Date    HIV disease     Syphilis       Past Surgical History:   Procedure Laterality Date    RECTAL SURGERY        Prescriptions Prior to Admission[1]  Review of patient's allergies indicates:  No Known Allergies   Social History     Tobacco Use    Smoking status: Never    Smokeless tobacco: Not on file   Substance Use Topics    Alcohol use: Yes     Alcohol/week: 0.0 standard drinks of alcohol     Comment: occasionally      No family history on file.     Review of Systems :  Review of Systems   Constitutional:  Negative for fever, malaise/fatigue and weight loss.   HENT:  Negative for congestion, hearing loss and nosebleeds.    Eyes:  Negative for blurred vision.   Respiratory:  Negative for cough and shortness of breath.    Cardiovascular:  Negative for chest pain and leg swelling.   Gastrointestinal:  Negative for abdominal pain, blood in stool, constipation, diarrhea, heartburn, melena, nausea and vomiting.   Genitourinary:  Negative for dysuria and hematuria.   Musculoskeletal:  Negative for joint pain and myalgias.   Skin:  Negative for itching and rash.   Neurological:  Negative for dizziness and focal weakness.   Endo/Heme/Allergies:  Does not bruise/bleed easily.   Psychiatric/Behavioral:  Negative for depression. The patient is not nervous/anxious.        Physical Exam :  Wt Readings  from Last 3 Encounters:   08/09/17 68.5 kg (151 lb 0.2 oz)   06/15/17 67.5 kg (148 lb 13 oz)   05/24/17 68 kg (150 lb)     Temp Readings from Last 3 Encounters:   08/09/17 98.7 °F (37.1 °C) (Oral)   06/15/17 98 °F (36.7 °C) (Oral)   05/29/17 98.8 °F (37.1 °C) (Oral)     BP Readings from Last 3 Encounters:   08/09/17 129/87   06/15/17 117/78   05/29/17 112/76     Pulse Readings from Last 3 Encounters:   08/09/17 102   06/15/17 77   05/29/17 78     There is no height or weight on file to calculate BMI.    Physical Exam  Vitals reviewed.   Constitutional:       Appearance: Normal appearance.   HENT:      Head: Normocephalic and atraumatic.      Nose: Nose normal.      Mouth/Throat:      Mouth: Mucous membranes are moist.   Eyes:      Pupils: Pupils are equal, round, and reactive to light.   Cardiovascular:      Rate and Rhythm: Normal rate and regular rhythm.   Pulmonary:      Effort: Pulmonary effort is normal.   Abdominal:      General: Abdomen is flat.   Musculoskeletal:         General: Normal range of motion.      Cervical back: Normal range of motion and neck supple.   Skin:     General: Skin is warm and dry.   Neurological:      Mental Status: He is alert. Mental status is at baseline.   Psychiatric:         Mood and Affect: Mood normal.         Behavior: Behavior normal.         Pertinent Diagnostic studies:    No results found for this or any previous visit (from the past 24 hours).    Assessment/Plan :         Polycythemia  -Hb 18.6 and hct 56 on outside labs  -appears to be recent, not present on labs in 2017  -Denied fmhx of hematological disorders  -Denies smoking or regular ETOH use  -Denies testosterone use or steroid use  -Denied polycythemia symptoms or SHELTON  -Will get basic workup with CBC, epo, retic UA today  -Based on workup results will decide if DAVID testing next step  -RTC in 4 weeks for MD virtual visit          Time spent on case: 45 minutes     Summary of orders placed this encounter:  No  orders of the defined types were placed in this encounter.      Future Appointments   Date Time Provider Department Center   5/16/2025  1:00 PM Wallace Alfredo MD [facility] HEM ONC [facility]         Wallace Alfredo MD   Hematology/oncology, [facility] - [place]           [1] (Not in a hospital admission)

## 2025-05-21 LAB — W ERYTHROPOIETIN (EPO): 5.6 MIU/ML

## 2025-06-20 ENCOUNTER — OFFICE VISIT (OUTPATIENT)
Dept: HEMATOLOGY/ONCOLOGY | Facility: CLINIC | Age: 40
End: 2025-06-20
Payer: COMMERCIAL

## 2025-06-20 DIAGNOSIS — Z71.2 ENCOUNTER TO DISCUSS TEST RESULTS: ICD-10-CM

## 2025-06-20 DIAGNOSIS — D75.1 POLYCYTHEMIA: Primary | ICD-10-CM

## 2025-06-20 NOTE — Clinical Note
-CBC only in 2 month at lab of his choice -RTC in 4 months for MD virtual visit with Dr. Marti with CBC day prior

## 2025-06-20 NOTE — PROGRESS NOTES
Audio Only Telehealth Visit     The patient location is: polycythemia  The chief complaint leading to consultation is: results  Visit type: Virtual visit with audio only (telephone)  Total time spent in medical discussion with patient: 10 minutes  Total time spent on date of the encounter: 20 minutes       The reason for the audio only service rather than synchronous audio and video virtual visit was related to technical difficulties or patient preference/necessity.       Each patient to whom I provide medical services by telemedicine is:  (1) informed of the relationship between the physician and patient and the respective role of any other health care provider with respect to management of the patient; and (2) notified that they may decline to receive medical services by telemedicine and may withdraw from such care at any time. Patient verbally consented to receive this service via voice-only telephone call.       HPI:      HPI  Pt is a 39 y.o. male who  has a past medical history of HIV disease and Syphilis. Who presents for follow up of polycythemia.        Pt feeling well at time of exam and most workup unremarkable and reviewed with pt.  As Initial CHILO testing negative and hb down trending will monitor hb with CBC but if not dropping further or rises again pt agreeable to additional workup.    Assessment and plan:         Polycythemia  -Hb 18.6 and hct 56 on outside labs, repeat CBC has hb of 17 so is lower   -appears to be recent, not present on labs in 2017  -Denied fmhx of hematological disorders  -Denied smoking or regular ETOH use  -Denied testosterone use or steroid use  -Denied polycythemia symptoms or SHELTON  -Workup largely unremarkable apart from elevated hb  -Chilo testing by PCP obtained recently (in media): JAK2 mutations were not detected in exons 12, 13, 14 and 15.   The G to T nucleotide change encoding the [id number] mutation was not detected.  -Poor hydration could contribute   -Since hb not rising  will recheck cbc in 2 months and if still elevated will get additional non DAVID polycythemia testing  -CBC only in 2 months  -RTC in 4 months for MD virtual visit with Dr. Marti with CBC day prior                 This service was not originating from a related E/M service provided within the previous 7 days nor will  to an E/M service or procedure within the next 24 hours or my soonest available appointment.  Prevailing standard of care was able to be met in this audio-only visit.

## 2025-08-19 ENCOUNTER — LAB VISIT (OUTPATIENT)
Dept: LAB | Facility: HOSPITAL | Age: 40
End: 2025-08-19
Attending: STUDENT IN AN ORGANIZED HEALTH CARE EDUCATION/TRAINING PROGRAM
Payer: COMMERCIAL

## 2025-08-19 DIAGNOSIS — D75.1 POLYCYTHEMIA: ICD-10-CM

## 2025-08-19 LAB
ABSOLUTE EOSINOPHIL (OHS): 0.58 K/UL
ABSOLUTE MONOCYTE (OHS): 0.96 K/UL (ref 0.3–1)
ABSOLUTE NEUTROPHIL COUNT (OHS): 8.7 K/UL (ref 1.8–7.7)
BASOPHILS # BLD AUTO: 0.08 K/UL
BASOPHILS NFR BLD AUTO: 0.6 %
ERYTHROCYTE [DISTWIDTH] IN BLOOD BY AUTOMATED COUNT: 12.3 % (ref 11.5–14.5)
HCT VFR BLD AUTO: 51 % (ref 40–54)
HGB BLD-MCNC: 17.2 GM/DL (ref 14–18)
IMM GRANULOCYTES # BLD AUTO: 0.03 K/UL (ref 0–0.04)
IMM GRANULOCYTES NFR BLD AUTO: 0.2 % (ref 0–0.5)
LYMPHOCYTES # BLD AUTO: 2.52 K/UL (ref 1–4.8)
MCH RBC QN AUTO: 30.1 PG (ref 27–31)
MCHC RBC AUTO-ENTMCNC: 33.7 G/DL (ref 32–36)
MCV RBC AUTO: 89 FL (ref 82–98)
NUCLEATED RBC (/100WBC) (OHS): 0 /100 WBC
PLATELET # BLD AUTO: 242 K/UL (ref 150–450)
PMV BLD AUTO: 9.2 FL (ref 9.2–12.9)
RBC # BLD AUTO: 5.71 M/UL (ref 4.6–6.2)
RELATIVE EOSINOPHIL (OHS): 4.5 %
RELATIVE LYMPHOCYTE (OHS): 19.6 % (ref 18–48)
RELATIVE MONOCYTE (OHS): 7.5 % (ref 4–15)
RELATIVE NEUTROPHIL (OHS): 67.6 % (ref 38–73)
WBC # BLD AUTO: 12.87 K/UL (ref 3.9–12.7)

## 2025-08-19 PROCEDURE — 85025 COMPLETE CBC W/AUTO DIFF WBC: CPT

## 2025-08-19 PROCEDURE — 36415 COLL VENOUS BLD VENIPUNCTURE: CPT
